# Patient Record
Sex: MALE | Race: WHITE | NOT HISPANIC OR LATINO | Employment: OTHER | URBAN - METROPOLITAN AREA
[De-identification: names, ages, dates, MRNs, and addresses within clinical notes are randomized per-mention and may not be internally consistent; named-entity substitution may affect disease eponyms.]

---

## 2018-05-15 ENCOUNTER — TELEPHONE (OUTPATIENT)
Dept: OTHER | Facility: CLINIC | Age: 58
End: 2018-05-15

## 2018-11-19 ENCOUNTER — OFFICE VISIT - HEALTHEAST (OUTPATIENT)
Dept: FAMILY MEDICINE | Facility: CLINIC | Age: 58
End: 2018-11-19

## 2018-11-19 DIAGNOSIS — L82.1 SEBORRHEIC KERATOSIS: ICD-10-CM

## 2018-11-19 DIAGNOSIS — F32.5 MAJOR DEPRESSION IN COMPLETE REMISSION (H): ICD-10-CM

## 2018-11-19 DIAGNOSIS — N50.89 MASS OF RIGHT TESTICLE: ICD-10-CM

## 2018-11-19 ASSESSMENT — MIFFLIN-ST. JEOR: SCORE: 1656.98

## 2018-12-10 ENCOUNTER — COMMUNICATION - HEALTHEAST (OUTPATIENT)
Dept: FAMILY MEDICINE | Facility: CLINIC | Age: 58
End: 2018-12-10

## 2020-02-04 ENCOUNTER — COMMUNICATION - HEALTHEAST (OUTPATIENT)
Dept: FAMILY MEDICINE | Facility: CLINIC | Age: 60
End: 2020-02-04

## 2020-02-04 DIAGNOSIS — F32.5 MAJOR DEPRESSION IN COMPLETE REMISSION (H): ICD-10-CM

## 2020-02-05 ENCOUNTER — COMMUNICATION - HEALTHEAST (OUTPATIENT)
Dept: FAMILY MEDICINE | Facility: CLINIC | Age: 60
End: 2020-02-05

## 2020-05-02 ENCOUNTER — COMMUNICATION - HEALTHEAST (OUTPATIENT)
Dept: FAMILY MEDICINE | Facility: CLINIC | Age: 60
End: 2020-05-02

## 2020-05-02 DIAGNOSIS — F32.5 MAJOR DEPRESSION IN COMPLETE REMISSION (H): ICD-10-CM

## 2020-05-07 ENCOUNTER — COMMUNICATION - HEALTHEAST (OUTPATIENT)
Dept: FAMILY MEDICINE | Facility: CLINIC | Age: 60
End: 2020-05-07

## 2020-05-07 ENCOUNTER — COMMUNICATION - HEALTHEAST (OUTPATIENT)
Dept: SCHEDULING | Facility: CLINIC | Age: 60
End: 2020-05-07

## 2020-05-12 ENCOUNTER — OFFICE VISIT - HEALTHEAST (OUTPATIENT)
Dept: FAMILY MEDICINE | Facility: CLINIC | Age: 60
End: 2020-05-12

## 2020-05-12 ENCOUNTER — COMMUNICATION - HEALTHEAST (OUTPATIENT)
Dept: FAMILY MEDICINE | Facility: CLINIC | Age: 60
End: 2020-05-12

## 2020-05-12 DIAGNOSIS — F33.41 RECURRENT MAJOR DEPRESSIVE DISORDER, IN PARTIAL REMISSION (H): ICD-10-CM

## 2020-05-12 DIAGNOSIS — F32.5 MAJOR DEPRESSION IN COMPLETE REMISSION (H): ICD-10-CM

## 2020-05-12 ASSESSMENT — PATIENT HEALTH QUESTIONNAIRE - PHQ9: SUM OF ALL RESPONSES TO PHQ QUESTIONS 1-9: 12

## 2020-07-31 ENCOUNTER — COMMUNICATION - HEALTHEAST (OUTPATIENT)
Dept: FAMILY MEDICINE | Facility: CLINIC | Age: 60
End: 2020-07-31

## 2020-07-31 DIAGNOSIS — F32.5 MAJOR DEPRESSION IN COMPLETE REMISSION (H): ICD-10-CM

## 2020-08-01 RX ORDER — BUPROPION HYDROCHLORIDE 150 MG/1
150 TABLET, EXTENDED RELEASE ORAL DAILY
Qty: 90 TABLET | Refills: 2 | Status: SHIPPED | OUTPATIENT
Start: 2020-08-01 | End: 2021-07-27

## 2020-10-09 ENCOUNTER — OFFICE VISIT - HEALTHEAST (OUTPATIENT)
Dept: FAMILY MEDICINE | Facility: CLINIC | Age: 60
End: 2020-10-09

## 2020-10-09 DIAGNOSIS — L82.1 SEBORRHEIC KERATOSIS: ICD-10-CM

## 2020-10-09 DIAGNOSIS — Z00.00 ROUTINE GENERAL MEDICAL EXAMINATION AT A HEALTH CARE FACILITY: ICD-10-CM

## 2020-10-09 DIAGNOSIS — F32.5 MAJOR DEPRESSION IN COMPLETE REMISSION (H): ICD-10-CM

## 2020-10-09 DIAGNOSIS — Z12.5 SCREENING FOR PROSTATE CANCER: ICD-10-CM

## 2020-10-09 DIAGNOSIS — K64.8 INTERNAL HEMORRHOID: ICD-10-CM

## 2020-10-09 DIAGNOSIS — N50.89 MASS OF RIGHT TESTICLE: ICD-10-CM

## 2020-10-09 LAB
CHOLEST SERPL-MCNC: 240 MG/DL
FASTING STATUS PATIENT QL REPORTED: YES
FASTING STATUS PATIENT QL REPORTED: YES
GLUCOSE BLD-MCNC: 77 MG/DL (ref 70–125)
HDLC SERPL-MCNC: 62 MG/DL
HIV 1+2 AB+HIV1 P24 AG SERPL QL IA: NEGATIVE
LDLC SERPL CALC-MCNC: 149 MG/DL
PSA SERPL-MCNC: 4.1 NG/ML (ref 0–4.5)
TRIGL SERPL-MCNC: 146 MG/DL

## 2020-10-09 RX ORDER — HYDROCORTISONE ACETATE 25 MG/1
25 SUPPOSITORY RECTAL EVERY 12 HOURS
Qty: 24 SUPPOSITORY | Refills: 1 | Status: SHIPPED | OUTPATIENT
Start: 2020-10-09 | End: 2021-07-27

## 2020-10-09 ASSESSMENT — MIFFLIN-ST. JEOR: SCORE: 1611.85

## 2020-10-09 ASSESSMENT — PATIENT HEALTH QUESTIONNAIRE - PHQ9: SUM OF ALL RESPONSES TO PHQ QUESTIONS 1-9: 3

## 2020-10-12 ENCOUNTER — COMMUNICATION - HEALTHEAST (OUTPATIENT)
Dept: FAMILY MEDICINE | Facility: CLINIC | Age: 60
End: 2020-10-12

## 2020-10-12 LAB — HCV AB SERPL QL IA: NEGATIVE

## 2021-05-26 ASSESSMENT — PATIENT HEALTH QUESTIONNAIRE - PHQ9: SUM OF ALL RESPONSES TO PHQ QUESTIONS 1-9: 12

## 2021-05-27 ASSESSMENT — PATIENT HEALTH QUESTIONNAIRE - PHQ9: SUM OF ALL RESPONSES TO PHQ QUESTIONS 1-9: 3

## 2021-06-02 VITALS — HEIGHT: 72 IN | WEIGHT: 181.2 LBS | BODY MASS INDEX: 24.54 KG/M2

## 2021-06-05 VITALS
WEIGHT: 173 LBS | SYSTOLIC BLOOD PRESSURE: 132 MMHG | HEART RATE: 80 BPM | BODY MASS INDEX: 24.22 KG/M2 | DIASTOLIC BLOOD PRESSURE: 80 MMHG | HEIGHT: 71 IN

## 2021-06-05 NOTE — TELEPHONE ENCOUNTER
RN cannot approve Refill Request    RN can NOT refill this medication Protocol failed and NO refill given.    Irina Awad, [facility] Triage/Med Refill 2/4/2020    Requested Prescriptions   Pending Prescriptions Disp Refills     buPROPion (WELLBUTRIN SR) 150 MG 12 hr tablet [Pharmacy Med Name: BUPROPION SR 150MG TABLETS (12 H)] 90 tablet 4     Sig: TAKE 1 TABLET BY MOUTH DAILY       Tricyclics/Misc Antidepressant/Antianxiety Meds Refill Protocol Failed - 2/4/2020  2:30 PM        Failed - PCP or prescribing provider visit in last year     Last office visit with prescriber/PCP: 11/19/2018 Precious Gonzalez MD OR same dept: Visit date not found OR same specialty: 11/19/2018 Precious Gonzalez MD  Last physical: Visit date not found Last MTM visit: Visit date not found   Next visit within 3 mo: Visit date not found  Next physical within 3 mo: Visit date not found  Prescriber OR PCP: Precious Gonzalez MD  Last diagnosis associated with med order: There are no diagnoses linked to this encounter.  If protocol passes may refill for 12 months if within 3 months of last provider visit (or a total of 15 months).

## 2021-06-07 NOTE — TELEPHONE ENCOUNTER
Left message to call back for: PT  Information to relay to patient:  Left message to call and schedule telephone or video visit with michelle

## 2021-06-07 NOTE — TELEPHONE ENCOUNTER
Left message to call back for: pt  Information to relay to patient:  2nd message to call and schedule with Christina Gutierrez re: follow up on depression

## 2021-06-07 NOTE — TELEPHONE ENCOUNTER
RN cannot approve Refill Request    RN can NOT refill this medication Protocol failed and NO refill given.    Irina Awad, [facility] Triage/Med Refill 5/4/2020    Requested Prescriptions   Pending Prescriptions Disp Refills     buPROPion (WELLBUTRIN SR) 150 MG 12 hr tablet [Pharmacy Med Name: BUPROPION SR 150MG TABLETS (12 H)] 90 tablet 3     Sig: TAKE 1 TABLET BY MOUTH DAILY       Tricyclics/Misc Antidepressant/Antianxiety Meds Refill Protocol Failed - 5/2/2020 12:10 PM        Failed - PCP or prescribing provider visit in last year     Last office visit with prescriber/PCP: 11/19/2018 Precious Gonzalez MD OR same dept: Visit date not found OR same specialty: 11/19/2018 Precious Gonzalez MD  Last physical: Visit date not found Last MTM visit: Visit date not found   Next visit within 3 mo: Visit date not found  Next physical within 3 mo: Visit date not found  Prescriber OR PCP: Precious Gonzalez MD  Last diagnosis associated with med order: 1. Major depression in complete remission (H)  - buPROPion (WELLBUTRIN SR) 150 MG 12 hr tablet [Pharmacy Med Name: BUPROPION SR 150MG TABLETS (12 H)]; TAKE 1 TABLET BY MOUTH DAILY  Dispense: 90 tablet; Refill: 0    If protocol passes may refill for 12 months if within 3 months of last provider visit (or a total of 15 months).

## 2021-06-08 NOTE — PROGRESS NOTES
"Moisés Sorensen is a 59 y.o. male who is being evaluated via a billable video visit.      The patient has been notified of following:     \"This video visit will be conducted via a call between you and your physician/provider. We have found that certain health care needs can be provided without the need for an in-person physical exam.  This service lets us provide the care you need with a video conversation.  If a prescription is necessary we can send it directly to your pharmacy.  If lab work is needed we can place an order for that and you can then stop by our lab to have the test done at a later time.    Video visits are billed at different rates depending on your insurance coverage. Please reach out to your insurance provider with any questions.    If during the course of the call the physician/provider feels a video visit is not appropriate, you will not be charged for this service.\"    Patient has given verbal consent to a Video visit? Yes    Patient would like to receive their AVS by AVS Preference: Kamila.    Patient would like the video invitation sent by: Text to cell phone: [phone]    Will anyone else be joining your video visit? No        Video Start Time: 9:15 AM    Assessment/Plan:    1. Major depression in complete remission (H)  His PHQ 9 score is 12. Patient feels that this is not entirely accurate and more so related to situational anxiety due to coronavirus pandemic. He prefers to stay on current dose of wellbutrin. Will provide refills. He is due for annual exam and routine healthcare. I encouraged him to schedule this within 3 months.   Depression well controlled on current regimen.  We will have him continue with Wellbutrin 150 mg once daily.  - buPROPion (WELLBUTRIN SR) 150 MG 12 hr tablet; Take 1 tablet (150 mg total) by mouth daily.  Dispense: 90 tablet; Refill: 4      The following are part of a depression follow up plan for the patient:  mental savanna screening education, mental " health screening management, mental health treatment assessment and mental health treatment education    Subjective:    Moisés Sorensen is a 59-year-old male here today for a video visit medication check.  He has been a year and a half since patient has been seen in clinic.  Past medical history is significant for depression and anxiety.  He has been on Wellbutrin sustained release 150 mg daily with great effect.  Previously was taking 2 but does not feel the need to be taking more than current dose at this time.  He does have occasional anxieties which he attributes to coronavirus pandemic.  Otherwise feels that depression anxiety is well controlled overall.  Occasionally has some insomnia for which he takes an Advil PM.  He feels that he is able to manage the insomnia at this point without the need for additional medication assistance.  He does not take any other regular medications.  He is due for an annual exam.      Past Medical History, Family History, and Social History reviewed.    No past surgical history on file.     Family History   Problem Relation Age of Onset     COPD Mother      Diabetes Son 16        type 1        No past medical history on file.     Social History     Tobacco Use     Smoking status: Never Smoker     Smokeless tobacco: Never Used   Substance Use Topics     Alcohol use: Not on file     Drug use: Not on file        Current Outpatient Medications   Medication Sig Dispense Refill     buPROPion (WELLBUTRIN SR) 150 MG 12 hr tablet Take 1 tablet (150 mg total) by mouth daily. 90 tablet 4     No current facility-administered medications for this visit.           Objective:    General Appearance:  Alert, pleasant, no distress, appears stated age   Neurologic: Appear grossly intact         This note has been dictated using voice recognition software. Any grammatical or context distortions are unintentional and inherent to the use of this software.       Video-Visit Details    Type of  service:  Video Visit    Video End Time (time video stopped): 9:15 AM  Originating Location (pt. Location): Home    Distant Location (provider location):  [facility] MEDICINE/OB     Platform used for Video Visit: Nick Gutierrez CNP

## 2021-06-10 NOTE — TELEPHONE ENCOUNTER
Refill Approved    Rx renewed per Medication Renewal Policy. Medication was last renewed on 5/12/20, last OV 5/12/20.    Danyelle Andujar, [facility] Triage/Med Refill 8/1/2020     Requested Prescriptions   Pending Prescriptions Disp Refills     buPROPion (WELLBUTRIN SR) 150 MG 12 hr tablet [Pharmacy Med Name: BUPROPION SR 150MG TABLETS (12 H)] 90 tablet 4     Sig: TAKE 1 TABLET BY MOUTH DAILY       Tricyclics/Misc Antidepressant/Antianxiety Meds Refill Protocol Passed - 7/31/2020 12:05 PM        Passed - PCP or prescribing provider visit in last year     Last office visit with prescriber/PCP: 11/19/2018 Precious Gonzalez MD OR same dept: Visit date not found OR same specialty: 11/19/2018 Precious Gonzalez MD  Last physical: Visit date not found Last MTM visit: Visit date not found   Next visit within 3 mo: Visit date not found  Next physical within 3 mo: Visit date not found  Prescriber OR PCP: Precious Gonzalez MD  Last diagnosis associated with med order: 1. Major depression in complete remission (H)  - buPROPion (WELLBUTRIN SR) 150 MG 12 hr tablet [Pharmacy Med Name: BUPROPION SR 150MG TABLETS (12 H)]; TAKE 1 TABLET BY MOUTH DAILY  Dispense: 90 tablet; Refill: 4    If protocol passes may refill for 12 months if within 3 months of last provider visit (or a total of 15 months).

## 2021-06-12 NOTE — TELEPHONE ENCOUNTER
Prior Authorization Request  Who s requesting:  Pharmacy  Pharmacy Name and Location: [facility]  Medication Name: hydrocortisone 25 mg suppository  Insurance Plan: [employer]  Insurance Member ID Number:  [id number]  CoverMyMeds Key: N/A  Informed patient that prior authorizations can take up to 10 business days for response:   NA  Okay to leave a detailed message: Yes

## 2021-06-12 NOTE — PROGRESS NOTES
Assessment:     1. Routine general medical examination at a health care facility  Ambulatory referral for Colonoscopy    Lipid [facility] FASTING    Td, Adult, Adsorbed (blue label)    Glucose    HIV Antigen/Antibody Screening [facility]    Hepatitis C Antibody (Anti-HCV)   2. Major depression in complete remission (H)     3. Seborrheic keratosis  Ambulatory referral to Dermatology   4. Mass of right testicle  Ambulatory referral to Urology   5. Internal hemorrhoid  hydrocortisone 25 mg suppository   6. Screening for prostate cancer  PSA, Annual Screen (Prostatic-Specific Antigen)        Plan:      1. Routine healthcare maintenance.  Preventative cares reviewed.  Encouraged healthy lifestyle modifications in regards to diet and exercise.  Patient declines Td, influenza, Shingrix today.  Will check fasting glucose and lipids.  Will do routine HIV and hepatitis C screening.  Continue ongoing annual exams.  2. Depression remains in remission while on bupropion 150 mg daily.  Continue at current dose.  3.  Seborrheic keratoses on center back.  Patient requesting referral to dermatology to discuss removal and for routine skin checks.  4.  Referral placed to urology for evaluation of right testicle mass.  5.  Patient's current symptoms and findings on exam most consistent with internal hemorrhoid.  Prescription for hydro cortisone suppository provided.  He will use for 2 weeks and follow-up with any persisting or worsening symptoms.  6.  Do routine prostate cancer screening today.    The following are part of a depression follow up plan for the patient:  mental health screening assessment, mental savanna screening education, mental health treatment assessment and mental health treatment education             Subjective:      Moisés Sorensen is a 60 y.o. male who presents for an annual exam.  Patient reports doing well over the last year.  It is been several years since he has been in for an annual exam.  Past medical  history includes depression, currently in remission.  He remains on bupropion 150 mg daily for management.  Tolerating well.  He has a few seborrheic keratoses lesions that he would like evaluated by dermatology to discuss possible removal.  He is also interested in routine skin checks.  He has had concern about a mass on his right testicle for several months.  Was going to follow-up with urology in regards to this but has not yet.  He had an ultrasound of his testicles and scrotum in 2018 which indicated benign mass.  He is hoping to discuss having this removed by urology.  He denies having any new urinary symptoms.  He is having some discomfort and itching in his rectum which he feels could be a hemorrhoid.  He has never had one in the past.  Denies any rectal bleeding.  He has been more constipated than usual recently.  Has felt the need to strain with BMs on multiple occasions.  He is requesting referral for routine colonoscopy which is due in 6 months.    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  Does not smoke.      Healthy Habits:   Regular Exercise: No  Healthy Diet: Yes  Dental Visits Regularly: Yes  Seat Belt: Yes   Sexually active: Yes  Colonoscopy: Yes  Lipid Profile: Yes  Glucose Screen: Yes    There is no immunization history for the selected administration types on file for this patient.  Immunization status: Tetanus administered today. Refuses flu, shingles, pneumonia.      Current Outpatient Medications   Medication Sig Dispense Refill     buPROPion (WELLBUTRIN SR) 150 MG 12 hr tablet Take 1 tablet (150 mg total) by mouth daily. 90 tablet 2     hydrocortisone 25 mg suppository Insert 1 suppository (25 mg total) into the rectum every 12 (twelve) hours. 24 suppository 1     No current facility-administered medications for this visit.      History reviewed. No pertinent past medical history.  History reviewed. No pertinent surgical history.  Patient has no known allergies.  Family  "History   Problem Relation Age of Onset     COPD Mother      Diabetes Son 16        type 1     Social History     Socioeconomic History     Marital status:      Spouse name: Not on file     Number of children: Not on file     Years of education: Not on file     Highest education level: Not on file   Occupational History     Not on file   Social Needs     Financial resource strain: Not on file     Food insecurity     Worry: Not on file     Inability: Not on file     Transportation needs     Medical: Not on file     Non-medical: Not on file   Tobacco Use     Smoking status: Never Smoker     Smokeless tobacco: Never Used   Substance and Sexual Activity     Alcohol use: Not on file     Drug use: Not on file     Sexual activity: Not on file   Lifestyle     Physical activity     Days per week: Not on file     Minutes per session: Not on file     Stress: Not on file   Relationships     Social connections     Talks on phone: Not on file     Gets together: Not on file     Attends Religion service: Not on file     Active member of club or organization: Not on file     Attends meetings of clubs or organizations: Not on file     Relationship status: Not on file     Intimate partner violence     Fear of current or ex partner: Not on file     Emotionally abused: Not on file     Physically abused: Not on file     Forced sexual activity: Not on file   Other Topics Concern     Not on file   Social History Narrative     Not on file       Review of Systems  Comprehensive ROS: as above, otherwise all negative.           Objective:     /80   Pulse 80   Ht 5' 11\" (1.803 m)   Wt 173 lb (78.5 kg)   BMI 24.13 kg/m    Body mass index is 24.13 kg/m .    Physical    General Appearance:    Alert, cooperative, no distress, appears stated age.     Head:    Normocephalic, without obvious abnormality, atraumatic   Eyes:    PERRL, conjunctiva/corneas clear, EOM's intact, fundi     benign, both eyes        Ears:    Normal TM's and " external ear canals, both ears   Nose:   Nares normal, septum midline, mucosa normal, no drainage    or sinus tenderness   Throat:   Lips, mucosa, and tongue normal; teeth and gums normal   Neck:   Supple, symmetrical, trachea midline, no adenopathy   Back:     Symmetric, no curvature, ROM normal, no CVA tenderness   Lungs:     Clear to auscultation bilaterally, respirations unlabored   Heart:    Regular rate and rhythm, S1 and S2 normal, no murmur, rub   or gallop   Abdomen:     Soft, non-tender, bowel sounds active all four quadrants,     no masses, no organomegaly.     Genitalia:    Normal male without lesion, discharge.  Small nodule in the right testicle.   Rectal:    Normal tone.  Prostate normal/symmetric, small mass most consistent with internal hemorrhoid noted on rectal exam today.   Extremities:   Extremities normal, atraumatic, no cyanosis or edema   Pulses:   2+ and symmetric all extremities   Skin:   Skin color, texture, turgor normal, no rashes or lesions   Lymph nodes:   Cervical, supraclavicular, and axillary nodes normal   Neurologic:   CNII-XII intact. Normal strength, sensation and reflexes       throughout                This note has been dictated using voice recognition software. Any grammatical or context distortions are unintentional and inherent to the use of this software.

## 2021-06-16 PROBLEM — L82.1 SEBORRHEIC KERATOSIS: Status: ACTIVE | Noted: 2018-11-19

## 2021-06-16 PROBLEM — F32.5 MAJOR DEPRESSION IN COMPLETE REMISSION (H): Status: ACTIVE | Noted: 2018-11-19

## 2021-06-17 NOTE — TELEPHONE ENCOUNTER
Telephone Encounter by Bridget Walker at 10/14/2020  3:46 PM     Author: Bridget Walker Service: -- Author Type: Patient Access    Filed: 10/14/2020  3:49 PM Encounter Date: 10/12/2020 Status: Signed    : Bridget Walker (Patient Access)       Prior Authorization Not Needed    I called the pharmacy and left the below information on their voicemail along with some NDC options that could probably process through for them.  They do have my direct number if they need further assistance.

## 2021-06-19 ENCOUNTER — HEALTH MAINTENANCE LETTER (OUTPATIENT)
Age: 61
End: 2021-06-19

## 2021-06-20 NOTE — LETTER
Letter by Precious Gonzalez MD at      Author: Precious Gonzalez MD Service: -- Author Type: --    Filed:  Encounter Date: 5/7/2020 Status: (Other)         Moisés Sorensen  [address] [place] [id number]      May 7, 2020      Dear Moisés Sorensen,    Per our refill protocol, you are due for a medication check office visit. Your prescription for Wellbutrin was sent to your pharmacy ([id number]). Please call [phone] to schedule an virtual visit with Dr Gonzalez or Christina Gutierrez before your next refill is needed to avoid delays.    Thank you,  [facility]

## 2021-06-20 NOTE — LETTER
Letter by Precious Gonzalez MD at      Author: Precious Gonzalez MD Service: -- Author Type: --    Filed:  Encounter Date: 5/7/2020 Status: (Other)         Moisés Sorensen  [address] [place] [id number]      May 7, 2020      Dear Moisés Sorensen,    Per our refill protocol, you are due for a medication check office visit. Your prescription for Wellbutrin was sent to your pharmacy.. Please call [phone] to schedule an office visit with  before your next refill is needed to avoid delays.    Thank you,  [facility]

## 2021-06-20 NOTE — LETTER
Letter by Precious Gonzalez MD at      Author: Precious Gonzalez MD Service: -- Author Type: --    Filed:  Encounter Date: 2/5/2020 Status: (Other)         Moisés Sorensen  [address] [place] [id number]      February 5, 2020      Dear Moisés Sorensen,    Per our refill protocol, you are due for a medication check office visit. Your prescription for Wellbutrin was sent to your pharmacy (02.05.2020). Please call [phone] to schedule an office visit with Dr. Gonzalez  before your next refill is needed to avoid delays.    Thank you,  [facility]

## 2021-06-21 NOTE — PROGRESS NOTES
Assessment/Plan:     1. Major depression in complete remission (H)  Well controlled on current regimen which she will continue.  Reviewed over-the-counter and nonmedical means to treat associated insomnia.  Follow-up in 1 year at most, sooner further problems or concerns.    2. Mass of right testicle  Will obtain follow-up ultrasound given perception of change.  Further follow-up and recommendations pending results.  - US Scrotum and Testicles W Duplex Ltd; Future    3. Seborrheic keratosis  Reassurance provided that this is a benign process and requires no further evaluation, treatment, or monitoring.      Patient Instructions   Our radiology office will call you to schedule ultrasound.    Return to clinic in 1 year for follow-up of depression.  Consider returning for a fasting physical in the interim.       Return in about 1 year (around 11/19/2019) for Depression.      Subjective:      Moisés Sorensen is a 58 y.o. male Center to clinic today to establish care and with a few concerns.  He is , this is his second marriage, 2 children both boys ages 16 and 22.  Works as a musician and , he and his wife are just adding onto their home in [place] with the intent to have music lessons, patient teaching both guitar and [employer].  He does not smoke.  Up to 1 alcoholic beverage per day, usually not daily.  Admits to no regular exercise but is not sedentary and moving much of the time.    Past medical history notable for depression and anxiety.  Is done quite well on Wellbutrin sustained release 150 mg previously twice daily and most recently taking just once daily with excellent effect.  Some struggles with insomnia.  Did not tolerate trazodone in the past as he had very terrible dreams.  Overall feels that he is managing well.  Some difficulties intermittently with early awakening, melatonin helpful with initial sleep onset.  Occasionally taking Tylenol PM with good effect.    History of  hyperlipidemia.  He initially did not tolerate Lipitor then has been taking some simvastatin regularly, ran out not too long ago.  Several years ago had difficulties with a lump or cyst on his right testicle.  Ultrasound was performed that was benign.  Unfortunately not able to find records of this.  States that no follow-up is needed.  Now more recently has noted a nagging sensation of pressure or otherwise bothering in his right testicle.  No change in size that he notes.  Is not really tender or swollen.  I note that his chart reports a diagnosis of hydrocele though patient describes it more as a focal cyst that is marblelike in size.    Current Outpatient Medications   Medication Sig Dispense Refill     buPROPion (WELLBUTRIN SR) 150 MG 12 hr tablet Take 1 tablet (150 mg total) by mouth daily Prescribed 1 tablet twice daily, patient only taking one tablet daily. 90 tablet 4     No current facility-administered medications for this visit.        Past Medical History, Family History, and Social History reviewed.  No past medical history on file.  No past surgical history on file.  Patient has no known allergies.  Family History   Problem Relation Age of Onset     COPD Mother      Diabetes Son 16        type 1     Social History     Socioeconomic History     Marital status:      Spouse name: Not on file     Number of children: Not on file     Years of education: Not on file     Highest education level: Not on file   Social Needs     Financial resource strain: Not on file     Food insecurity - worry: Not on file     Food insecurity - inability: Not on file     Transportation needs - medical: Not on file     Transportation needs - non-medical: Not on file   Occupational History     Not on file   Tobacco Use     Smoking status: Never Smoker     Smokeless tobacco: Never Used   Substance and Sexual Activity     Alcohol use: Not on file     Drug use: Not on file     Sexual activity: Not on file   Other Topics  "Concern     Not on file   Social History Narrative     Not on file         Review of systems is as stated in HPI, and the remainder of the 10 system review is otherwise negative.    Objective:     Vitals:    11/19/18 0944   BP: 122/80   Patient Site: Left Arm   Patient Position: Sitting   Cuff Size: Adult Large   Pulse: 78   Resp: 16   Temp: 98.2  F (36.8  C)   TempSrc: Oral   SpO2: 98%   Weight: 181 lb 3.2 oz (82.2 kg)   Height: 5' 11.5\" (1.816 m)    Body mass index is 24.92 kg/m .    Alert male.  Mucous memories moist.  Neck supple without lymphadenopathy.  Heart with regular rate and rhythm.  Lungs clear.  Abdomen is soft and nontender.  Normal external male genitalia.  Appreciate any significant enlargement of his right testicle in comparison to his left.  I do not appreciate a large hydrocele.  There is a small cystic-like structure that smooth and I agree that is about the size of a marble in the right superior testicle.  No hernias present.  Extremities without edema.  Affect is within normal limits.      This note has been dictated using voice recognition software. Any grammatical or context distortions are unintentional and inherent to the the software.   "

## 2021-07-16 ENCOUNTER — ANCILLARY PROCEDURE (OUTPATIENT)
Dept: GENERAL RADIOLOGY | Facility: CLINIC | Age: 61
End: 2021-07-16
Attending: FAMILY MEDICINE
Payer: COMMERCIAL

## 2021-07-16 ENCOUNTER — OFFICE VISIT (OUTPATIENT)
Dept: FAMILY MEDICINE | Facility: CLINIC | Age: 61
End: 2021-07-16
Payer: COMMERCIAL

## 2021-07-16 VITALS
BODY MASS INDEX: 24.95 KG/M2 | TEMPERATURE: 96.5 F | DIASTOLIC BLOOD PRESSURE: 89 MMHG | WEIGHT: 178.9 LBS | RESPIRATION RATE: 16 BRPM | HEART RATE: 56 BPM | SYSTOLIC BLOOD PRESSURE: 135 MMHG

## 2021-07-16 DIAGNOSIS — M79.605 PAIN OF LEFT LOWER EXTREMITY: ICD-10-CM

## 2021-07-16 DIAGNOSIS — M25.572 PAIN IN JOINT, ANKLE AND FOOT, LEFT: ICD-10-CM

## 2021-07-16 DIAGNOSIS — M25.572 PAIN IN JOINT, ANKLE AND FOOT, LEFT: Primary | ICD-10-CM

## 2021-07-16 PROCEDURE — 73610 X-RAY EXAM OF ANKLE: CPT | Mod: TC | Performed by: RADIOLOGY

## 2021-07-16 PROCEDURE — 72100 X-RAY EXAM L-S SPINE 2/3 VWS: CPT | Mod: TC | Performed by: RADIOLOGY

## 2021-07-16 PROCEDURE — 99214 OFFICE O/P EST MOD 30 MIN: CPT | Performed by: FAMILY MEDICINE

## 2021-07-16 RX ORDER — PREDNISONE 20 MG/1
40 TABLET ORAL DAILY
Qty: 10 TABLET | Refills: 0 | Status: SHIPPED | OUTPATIENT
Start: 2021-07-16 | End: 2021-07-21

## 2021-07-16 NOTE — PROGRESS NOTES
"      Assessment & Plan    1. Pain in joint, ankle and foot, left  Pain is stabbing/jabbing - comes and goes - lasts seconds to minutes - but increasing in sharpness - has spent decades folding his left ankle under his right foot while playing guitar; this may represent some compression on a local nerve - OR - may represent low back nerve compression.  Unable to review xray with patient as unable to access the images during visit.  Will review and move from there.  In the meanwhile, will treat with oral steroids.  If no improvement, consider referral to foot/ankle.    - XR Ankle Left G/E 3 Views; Future  - predniSONE (DELTASONE) 20 MG tablet; Take 2 tablets (40 mg) by mouth daily for 5 days  Dispense: 10 tablet; Refill: 0    2. Pain of left lower extremity  Has intermittent pain left ankle, but also has left groin (intermittent) pain - pain from left lower back - it is not clear that this is related to #1.    - XR Ankle Left G/E 3 Views; Future  - XR Lumbar Spine 2/3 Views; Future  - predniSONE (DELTASONE) 20 MG tablet; Take 2 tablets (40 mg) by mouth daily for 5 days  Dispense: 10 tablet; Refill: 0      HPI     Pain comes/goes  ; left groin - felt like a \"pulled muscle\" a month ago; left heel - occurring 2 weeks ago -     Has tried numerous foot exercises  Icing helped somewhat  Sometimes feels like he is \"compressed\" in his spine  Dull pain in left groin - like pulled a groin muscle    Pain is in left side -   Feels deep on heel - lateral -   Just has a zap of pain - comes and goes -   Very random -   Can stop for a couple days/couple weeks   Noticed when his back health is \"worse\", gets the symtpoms  Chronic back stuff x many many years  Varying degrees of pain    Feels like he's being \"squished\"  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     Current Outpatient Medications   Medication     buPROPion (WELLBUTRIN SR) 150 MG 12 hr tablet     predniSONE (DELTASONE) 20 MG tablet     hydrocortisone 25 mg suppository     No current facility-administered medications for this visit.        No past surgical history on file.     Social History     Socioeconomic History     Marital status:      Spouse name: Not on file     Number of children: Not on file     Years of education: Not on file     Highest education level: Not on file   Occupational History     Not on file   Tobacco Use     Smoking status: Never Smoker     Smokeless tobacco: Never Used   Substance and Sexual Activity     Alcohol use: Not on file     Drug use: Not on file     Sexual activity: Not on file   Other Topics Concern     Not on file   Social History Narrative     Not on file     Social Determinants of Health     Financial Resource Strain:      Difficulty of Paying Living Expenses:    Food Insecurity:      Worried About Running Out of Food in the Last Year:      Ran Out of Food in the Last Year:    Transportation Needs:      Lack of Transportation (Medical):      Lack of Transportation (Non-Medical):    Physical Activity:      Days of Exercise per Week:      Minutes of Exercise per Session:    Stress:      Feeling of Stress :    Social Connections:      Frequency of Communication with Friends and Family:      Frequency of Social Gatherings with Friends and Family:      Attends Yazidi Services:      Active Member of Clubs or Organizations:      Attends Club or Organization Meetings:      Marital Status:     Intimate Partner Violence:      Fear of Current or Ex-Partner:      Emotionally Abused:      Physically Abused:      Sexually Abused:         Family History   Problem Relation Age of Onset     Chronic Obstructive Pulmonary Disease Mother      Diabetes Son 16.00        type 1        Review of Systems   Constitutional: Negative for chills and fever.   HENT: Negative for ear pain and sore throat.    Eyes: Negative for pain and visual disturbance.   Respiratory: Negative for cough and shortness of breath.    Cardiovascular: Negative for chest pain and palpitations.   Gastrointestinal: Negative for abdominal pain and vomiting.   Genitourinary: Negative for dysuria and hematuria.   Musculoskeletal: Negative for arthralgias and back pain.   Skin: Negative for color change and rash.   Neurological: Negative for seizures and syncope.   All other systems reviewed and are negative.       /89 (BP Location: Right arm, Patient Position: Sitting, Cuff Size: Adult Large)   Pulse 56   Temp (!) 96.5  F (35.8  C) (Temporal)   Resp 16   Wt 81.1 kg (178 lb 14.4 oz)   BMI 24.95 kg/m       Physical Exam  Vitals reviewed.   Constitutional:       General: He is not in acute distress.     Appearance: Normal appearance.   HENT:      Head: Normocephalic.      Right Ear: Tympanic membrane, ear canal and external ear normal.      Left Ear: Tympanic membrane, ear canal and external ear normal.      Nose: Nose normal.      Mouth/Throat:      Mouth: Mucous membranes are moist.      Pharynx: No posterior oropharyngeal erythema.   Eyes:      Extraocular Movements: Extraocular movements intact.      Conjunctiva/sclera: Conjunctivae normal.      Pupils: Pupils are equal, round, and reactive to light.   Cardiovascular:      Rate and Rhythm: Normal rate and regular rhythm.      Pulses: Normal pulses.      Heart sounds: Normal heart sounds. No murmur heard.     Pulmonary:      Effort: Pulmonary effort is normal.      Breath sounds: Normal  breath sounds.   Abdominal:      Palpations: Abdomen is soft. There is no mass.      Tenderness: There is no abdominal tenderness. There is no guarding or rebound.   Genitourinary:     Penis: Normal.       Testes: Normal.   Musculoskeletal:         General: No swelling or deformity. Normal range of motion.      Cervical back: Normal range of motion and neck supple.   Lymphadenopathy:      Cervical: No cervical adenopathy.   Skin:     General: Skin is warm and dry.   Neurological:      General: No focal deficit present.      Mental Status: He is alert and oriented to person, place, and time.      Motor: No weakness.      Gait: Gait normal.   Psychiatric:         Mood and Affect: Mood normal.         Behavior: Behavior normal.     Results:  No formal xray results available at the time of this dictation.           BEAU DAVIS MD

## 2021-07-17 PROBLEM — F32.5 MAJOR DEPRESSION IN COMPLETE REMISSION (H): Status: RESOLVED | Noted: 2018-11-19 | Resolved: 2021-07-17

## 2021-07-17 PROBLEM — L82.1 SEBORRHEIC KERATOSIS: Status: RESOLVED | Noted: 2018-11-19 | Resolved: 2021-07-17

## 2021-07-17 ASSESSMENT — ENCOUNTER SYMPTOMS
SHORTNESS OF BREATH: 0
ARTHRALGIAS: 0
EYE PAIN: 0
HEMATURIA: 0
COLOR CHANGE: 0
COUGH: 0
VOMITING: 0
DYSURIA: 0
FEVER: 0
CHILLS: 0
PALPITATIONS: 0
SEIZURES: 0
BACK PAIN: 0
ABDOMINAL PAIN: 0
SORE THROAT: 0

## 2021-07-21 DIAGNOSIS — F32.5 MAJOR DEPRESSION IN COMPLETE REMISSION (H): ICD-10-CM

## 2021-07-26 ENCOUNTER — TELEPHONE (OUTPATIENT)
Dept: FAMILY MEDICINE | Facility: CLINIC | Age: 61
End: 2021-07-26

## 2021-07-26 DIAGNOSIS — M25.572 PAIN IN JOINT, ANKLE AND FOOT, LEFT: Primary | ICD-10-CM

## 2021-07-26 RX ORDER — PREDNISONE 20 MG/1
40 TABLET ORAL DAILY
Qty: 10 TABLET | Refills: 0 | Status: SHIPPED | OUTPATIENT
Start: 2021-07-26 | End: 2021-12-22

## 2021-07-26 NOTE — TELEPHONE ENCOUNTER
Reason for Call:  Medication or medication refill:    Do you use a [facility]?  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 YES    Phone number patient can be reached at: Cell number on file:    Telephone Information:   Mobile [phone]       Best Time: anytime     Call taken on 7/26/2021 at 10:07 AM by Rosa Crawford

## 2021-07-26 NOTE — TELEPHONE ENCOUNTER
Pt is wanting another round of prednisone, he is going on a driving vacation for 2 weeks and has been getting the electronic zaps in his left ankle. He was seen 9 days ago and was given an rx then but has since completed it.

## 2021-07-26 NOTE — TELEPHONE ENCOUNTER
"Routing refill request to provider for review/approval because:  Labs not current:  PHQ-9 score.     Last Written Prescription Date:  8/1/2020  Last Fill Quantity: 90,  # refills: 2   Last office visit provider:  7/16/21     Requested Prescriptions   Pending Prescriptions Disp Refills     buPROPion (WELLBUTRIN SR) 150 MG 12 hr tablet 90 tablet 2     Sig: Take 1 tablet (150 mg) by mouth daily       SSRIs Protocol Failed - 7/21/2021 12:05 PM        Failed - PHQ-9 score less than 5 in past 6 months     Please review last PHQ-9 score.           Failed - Recent (6 mo) or future (30 days) visit within the authorizing provider's specialty     Patient had office visit in the last 6 months or has a visit in the next 30 days with authorizing provider or within the authorizing provider's specialty.  See \"Patient Info\" tab in inbasket, or \"Choose Columns\" in Meds & Orders section of the refill encounter.            Passed - Medication is Bupropion     If the medication is Bupropion (Wellbutrin), and the patient is taking for smoking cessation; OK to refill.          Passed - Medication is active on med list        Passed - Patient is age 18 or older             Shakir Goncalves RN 07/25/21 7:16 PM  "

## 2021-07-27 RX ORDER — BUPROPION HYDROCHLORIDE 150 MG/1
150 TABLET, EXTENDED RELEASE ORAL DAILY
Qty: 90 TABLET | Refills: 2 | Status: SHIPPED | OUTPATIENT
Start: 2021-07-27 | End: 2022-03-03

## 2021-10-10 ENCOUNTER — HEALTH MAINTENANCE LETTER (OUTPATIENT)
Age: 61
End: 2021-10-10

## 2021-10-11 ENCOUNTER — ALLIED HEALTH/NURSE VISIT (OUTPATIENT)
Dept: FAMILY MEDICINE | Facility: CLINIC | Age: 61
End: 2021-10-11
Payer: COMMERCIAL

## 2021-10-11 DIAGNOSIS — Z23 NEED FOR VACCINATION: Primary | ICD-10-CM

## 2021-10-11 PROCEDURE — 90682 RIV4 VACC RECOMBINANT DNA IM: CPT

## 2021-10-11 PROCEDURE — 90471 IMMUNIZATION ADMIN: CPT

## 2021-10-11 PROCEDURE — 99207 PR NO CHARGE NURSE ONLY: CPT

## 2021-10-19 PROBLEM — F32.9 MAJOR DEPRESSION: Status: RESOLVED | Noted: 2018-11-19 | Resolved: 2021-07-17

## 2021-12-04 ENCOUNTER — HEALTH MAINTENANCE LETTER (OUTPATIENT)
Age: 61
End: 2021-12-04

## 2021-12-06 ENCOUNTER — ANCILLARY PROCEDURE (OUTPATIENT)
Dept: GENERAL RADIOLOGY | Facility: CLINIC | Age: 61
End: 2021-12-06
Attending: STUDENT IN AN ORGANIZED HEALTH CARE EDUCATION/TRAINING PROGRAM
Payer: COMMERCIAL

## 2021-12-06 ENCOUNTER — OFFICE VISIT (OUTPATIENT)
Dept: FAMILY MEDICINE | Facility: CLINIC | Age: 61
End: 2021-12-06
Payer: COMMERCIAL

## 2021-12-06 VITALS
OXYGEN SATURATION: 98 % | HEART RATE: 69 BPM | BODY MASS INDEX: 25.16 KG/M2 | SYSTOLIC BLOOD PRESSURE: 152 MMHG | DIASTOLIC BLOOD PRESSURE: 84 MMHG | WEIGHT: 180.4 LBS

## 2021-12-06 DIAGNOSIS — R10.32 LEFT GROIN PAIN: Primary | ICD-10-CM

## 2021-12-06 DIAGNOSIS — R10.32 LEFT GROIN PAIN: ICD-10-CM

## 2021-12-06 PROCEDURE — 73502 X-RAY EXAM HIP UNI 2-3 VIEWS: CPT | Mod: TC | Performed by: RADIOLOGY

## 2021-12-06 PROCEDURE — 99214 OFFICE O/P EST MOD 30 MIN: CPT | Performed by: STUDENT IN AN ORGANIZED HEALTH CARE EDUCATION/TRAINING PROGRAM

## 2021-12-06 RX ORDER — VALACYCLOVIR HYDROCHLORIDE 1 G/1
1000 TABLET, FILM COATED ORAL 3 TIMES DAILY
Qty: 21 TABLET | Refills: 0 | Status: SHIPPED | OUTPATIENT
Start: 2021-12-06 | End: 2021-12-13

## 2021-12-06 NOTE — PROGRESS NOTES
"  Assessment and Plan     61-year-old male who presents with left inner thigh pain going on intermittently for a year.  Worsened over the last month.  Just in the last week he has started to have a rash in the area that appears mostly like irritation at the skin.  No obvious vesicles were other pathognomonic features.  I recommended working this up with an x-ray of his hip given that most of the pain seems into his groin, ultrasound of his left lower extremity looking for thrombophlebitis and empiric treatment for shingles as his description of the pain is somewhat consistent with this though he does not have the typical rash.  Once I get the results back to him all these if it is normal and valacyclovir does not improve symptoms I would would consider doing something like an EMG study given the shooting electric-like pain he has in the area being possible peripheral neuropathy versus an MRI of his lumbar spine as he does have some pain at his SI joint on the left.    1. Left groin pain  - XR Hip Left 2-3 Views; Future  - US Lower Extremity Venous Duplex Bilateral; Future  - valACYclovir (VALTREX) 1000 mg tablet; Take 1 tablet (1,000 mg) by mouth 3 times daily for 7 days  Dispense: 21 tablet; Refill: 0    Follow up: Pending studies  Options for treatment and follow-up care were reviewed with the patient and/or guardian. Moisés PENNY Sorensen and/or guardian engaged in the decision making process and verbalized understanding of the options discussed and agreed with the final plan.    Dr. Neo Reynoso         HPI:   Moisés PENNY VillatoroSorensen is a 61 year old  male who presents for:    Chief Complaint   Patient presents with     Derm Problem     skin irritation, sharp shooting pain in inner thigh on left side. left wrist pain, lifted something heavy.      Patient tells me he has had pain in his inner thigh to the right side going on intermittely since this last summer. He describes it as a \"jolt\" like sensation. He now has a " "rash in ther area that happened over the weekend. He describes the pain as \"sgharp and shooting\" last for a couple of seconds and then going away. Nothing seems to bring it on. He feels like if he stretched his groin area that helps. He has had some pain in his low back going on as well. Denies numbness or tingling. Denies symptoms shooting down the leg. Very specific to his inner thigh.           PMHX:     Patient Active Problem List   Diagnosis     Anxiety     Depression, major     Dyslipidemia     Insomnia     Mixed hyperlipidemia     Benign neoplasm of sigmoid colon     Polyp of colon       Current Outpatient Medications   Medication Sig Dispense Refill     buPROPion (WELLBUTRIN SR) 150 MG 12 hr tablet Take 1 tablet (150 mg) by mouth daily 90 tablet 2       Social History     Tobacco Use     Smoking status: Never Smoker     Smokeless tobacco: Never Used   Substance Use Topics     Alcohol use: Not on file     Drug use: Not on file       Social History     Social History Narrative     Not on file       No Known Allergies    No results found for this or any previous visit (from the past 24 hour(s)).         Review of Systems:    ROS: 10 point ROS neg other than the symptoms noted above in the HPI.         Physical Exam:     Vitals:    12/06/21 1427   BP: (!) 152/84   BP Location: Right arm   Patient Position: Sitting   Cuff Size: Adult Regular   Pulse: 69   SpO2: 98%   Weight: 81.8 kg (180 lb 6.4 oz)     Body mass index is 25.16 kg/m .    General appearance: Alert, cooperative, no distress, appears stated age  Head: Normocephalic, atraumatic, without obvious abnormality  Eyes: Pupils equal round, reactive.  Conjunctiva clear.  Nose: Nares normal, no drainage.  Throat: Lips, mucosa, tongue normal mucosa pink and moist  Neck: Supple, symmetric, trachea midline  Extremities: Extremities normal, atraumatic.  No cyanosis or edema.  Skin: reddened irritated skin over his left inner thigh  Neurologic: CN II through XII " intact, normal strength.      Left Hip:  Inspection: Swelling - Yes mild in left inner hip; Bruising - no  ROM: Flexion -normal; Extension - normal; ER - normal; IR -normal; Abduction -normal; Adduction normal  Strength: Full in all planes;   Tenderness: Trochanter - no ASIS - no; Inguinal Ligament - no; Ischial Tuberosity-  no; Hamstring - no; SI Joint - Yes.  Maneuvers: LISA - normal; FADIR - normal; SI joint - Yes;

## 2021-12-07 ASSESSMENT — PATIENT HEALTH QUESTIONNAIRE - PHQ9: SUM OF ALL RESPONSES TO PHQ QUESTIONS 1-9: 5

## 2021-12-07 NOTE — RESULT ENCOUNTER NOTE
I called and spoke with the patient about their recent clinic visit results. I answered any questions they had.      Dr. Neo Reynoso

## 2021-12-10 ENCOUNTER — HOSPITAL ENCOUNTER (OUTPATIENT)
Dept: ULTRASOUND IMAGING | Facility: CLINIC | Age: 61
Discharge: HOME | End: 2021-12-10
Attending: STUDENT IN AN ORGANIZED HEALTH CARE EDUCATION/TRAINING PROGRAM | Admitting: STUDENT IN AN ORGANIZED HEALTH CARE EDUCATION/TRAINING PROGRAM
Payer: COMMERCIAL

## 2021-12-10 DIAGNOSIS — M54.16 LUMBAR RADICULOPATHY: Primary | ICD-10-CM

## 2021-12-10 DIAGNOSIS — R10.32 LEFT GROIN PAIN: ICD-10-CM

## 2021-12-10 PROCEDURE — 93971 EXTREMITY STUDY: CPT | Mod: LT

## 2021-12-10 NOTE — RESULT ENCOUNTER NOTE
"I called and spoke with the patient about their recent clinic visit results. I answered any questions they had. Patient still having pain in inner thigh. Having more back pain and \"seizing up\" Recommended MRI of his lumbar spine to work up further. Interestingly he tells me skin changes maybe frost bite form cold pack.       Dr. Neo Reynoso    "

## 2021-12-22 ENCOUNTER — OFFICE VISIT (OUTPATIENT)
Dept: FAMILY MEDICINE | Facility: CLINIC | Age: 61
End: 2021-12-22
Payer: COMMERCIAL

## 2021-12-22 VITALS
TEMPERATURE: 98.2 F | HEART RATE: 80 BPM | SYSTOLIC BLOOD PRESSURE: 146 MMHG | DIASTOLIC BLOOD PRESSURE: 60 MMHG | HEIGHT: 71 IN | WEIGHT: 180 LBS | OXYGEN SATURATION: 97 % | RESPIRATION RATE: 20 BRPM | BODY MASS INDEX: 25.2 KG/M2

## 2021-12-22 DIAGNOSIS — M54.42 CHRONIC BILATERAL LOW BACK PAIN WITH LEFT-SIDED SCIATICA: Primary | ICD-10-CM

## 2021-12-22 DIAGNOSIS — G89.29 CHRONIC BILATERAL LOW BACK PAIN WITH LEFT-SIDED SCIATICA: Primary | ICD-10-CM

## 2021-12-22 PROCEDURE — 99213 OFFICE O/P EST LOW 20 MIN: CPT | Performed by: FAMILY MEDICINE

## 2021-12-22 RX ORDER — PREDNISONE 20 MG/1
40 TABLET ORAL DAILY
Qty: 10 TABLET | Refills: 0 | Status: SHIPPED | OUTPATIENT
Start: 2021-12-22 | End: 2021-12-27

## 2021-12-22 RX ORDER — GABAPENTIN 300 MG/1
300 CAPSULE ORAL 3 TIMES DAILY PRN
Qty: 90 CAPSULE | Refills: 1 | Status: SHIPPED | OUTPATIENT
Start: 2021-12-22 | End: 2023-07-19

## 2021-12-22 ASSESSMENT — MIFFLIN-ST. JEOR: SCORE: 1630.66

## 2021-12-29 NOTE — PROGRESS NOTES
Assessment/Plan:     Chronic bilateral low back pain with left-sided sciatica  His symptoms are most consistent with left lumbar radiculopathy.  We reviewed symptomatic treatments.  Because he had good effect with prednisone in the past, will repeat course of prednisone.  We will also use gabapentin as needed for discomfort.  Hold off on NSAIDs until course of prednisone is complete.  Referral placed for physical therapy.  Reviewed red flag warning signs and symptoms, when to notify us or seek urgent evaluation for new symptoms.  If symptoms are not improving and controlled, could consider MRI his neck step.  He is comfortable with this plan.  - Physical Therapy Referral; Future  - gabapentin (NEURONTIN) 300 MG capsule; Take 1 capsule (300 mg) by mouth 3 times daily as needed for neuropathic pain  - predniSONE (DELTASONE) 20 MG tablet; Take 2 tablets (40 mg) by mouth daily for 5 days      There are no Patient Instructions on file for this visit.     No follow-ups on file.  Advised future Tdap and Shingrix.      Subjective:      Moisés Sorensen is a 61 year old male presenting to clinic today to discuss low back pain associated with pain in his left leg.  Onset of symptoms in March with sharp shooting pain in his left posterior heel and left inner thigh.  Then began to notice increasing pain in his low back with tightness, stiffness, and spasms.  He had a course of prednisone following an ultrasound was negative for DVT in that left leg, prednisone was somewhat helpful and now it seems to have returned.  Pain is worse if he is doing any prolonged standing, sitting in a car for long stretch, or intermittently with sleep.  He is noting some improvement with stretching.  He has been taking 3-4 tabs of Advil daily and it is somewhat helpful with the back pain.  Denies any injury.    Current Outpatient Medications   Medication Sig Dispense Refill     gabapentin (NEURONTIN) 300 MG capsule Take 1 capsule (300 mg) by  "mouth 3 times daily as needed for neuropathic pain 90 capsule 1     buPROPion (WELLBUTRIN SR) 150 MG 12 hr tablet Take 1 tablet (150 mg) by mouth daily 90 tablet 2     valACYclovir (VALTREX) 1000 mg tablet Take 1 tablet (1,000 mg) by mouth 3 times daily for 7 days 21 tablet 0       Past Medical History, Family History, and Social History reviewed.  No past medical history on file.  No past surgical history on file.  Patient has no known allergies.  Family History   Problem Relation Age of Onset     Chronic Obstructive Pulmonary Disease Mother      Diabetes Son 16.00        type 1     Social History     Socioeconomic History     Marital status:      Spouse name: Not on file     Number of children: Not on file     Years of education: Not on file     Highest education level: Not on file   Occupational History     Not on file   Tobacco Use     Smoking status: Never Smoker     Smokeless tobacco: Never Used   Substance and Sexual Activity     Alcohol use: Not on file     Drug use: Not on file     Sexual activity: Not on file   Other Topics Concern     Not on file   Social History Narrative     Not on file     Social Determinants of Health     Financial Resource Strain: Not on file   Food Insecurity: Not on file   Transportation Needs: Not on file   Physical Activity: Not on file   Stress: Not on file   Social Connections: Not on file   Intimate Partner Violence: Not on file   Housing Stability: Not on file         Review of systems is as stated in HPI, and the remainder of the 10 system review is otherwise negative.    Objective:     Vitals:    12/22/21 0927   BP: (!) 146/60   Pulse: 80   Resp: 20   Temp: 98.2  F (36.8  C)   TempSrc: Temporal   SpO2: 97%   Weight: 81.6 kg (180 lb)   Height: 1.791 m (5' 10.5\")    Body mass index is 25.46 kg/m .    Alert male.  He has good range of motion of his low back throughout.  He has no midline spinal tenderness.  Mild tenderness to palpation and muscle spasm of the " paraspinous muscles in the lumbar region bilaterally.  Mild discomfort in the SI joints left greater than right.  5 and 5 strength throughout his lower extremities.  Good range of motion at hips and knees.  Deep tendon reflexes intact.  Sensation intact.      This note has been dictated using voice recognition software. Any grammatical or context distortions are unintentional and inherent to the the software.

## 2022-03-03 DIAGNOSIS — F32.5 MAJOR DEPRESSION IN COMPLETE REMISSION (H): ICD-10-CM

## 2022-03-03 RX ORDER — BUPROPION HYDROCHLORIDE 150 MG/1
150 TABLET, EXTENDED RELEASE ORAL DAILY
Qty: 90 TABLET | Refills: 1 | Status: SHIPPED | OUTPATIENT
Start: 2022-03-03 | End: 2023-06-02

## 2022-03-03 NOTE — TELEPHONE ENCOUNTER
Pending Prescriptions:                       Disp   Refills    buPROPion (WELLBUTRIN SR) 150 MG 12 hr ta*90 tab*2            Sig: Take 1 tablet (150 mg) by mouth daily    Last filled- 07.27.2021  Last OV- 12.22.2021

## 2022-09-18 ENCOUNTER — HEALTH MAINTENANCE LETTER (OUTPATIENT)
Age: 62
End: 2022-09-18

## 2023-01-28 ENCOUNTER — HEALTH MAINTENANCE LETTER (OUTPATIENT)
Age: 63
End: 2023-01-28

## 2023-07-19 ENCOUNTER — OFFICE VISIT (OUTPATIENT)
Dept: FAMILY MEDICINE | Facility: CLINIC | Age: 63
End: 2023-07-19
Payer: COMMERCIAL

## 2023-07-19 VITALS
RESPIRATION RATE: 20 BRPM | DIASTOLIC BLOOD PRESSURE: 70 MMHG | OXYGEN SATURATION: 97 % | TEMPERATURE: 97.9 F | HEIGHT: 71 IN | WEIGHT: 188 LBS | SYSTOLIC BLOOD PRESSURE: 136 MMHG | HEART RATE: 90 BPM | BODY MASS INDEX: 26.32 KG/M2

## 2023-07-19 DIAGNOSIS — F32.5 MAJOR DEPRESSIVE DISORDER IN FULL REMISSION, UNSPECIFIED WHETHER RECURRENT (H): ICD-10-CM

## 2023-07-19 DIAGNOSIS — Z12.5 SCREENING FOR PROSTATE CANCER: ICD-10-CM

## 2023-07-19 DIAGNOSIS — Z00.00 ROUTINE PHYSICAL EXAMINATION: Primary | ICD-10-CM

## 2023-07-19 DIAGNOSIS — Z12.11 SCREEN FOR COLON CANCER: ICD-10-CM

## 2023-07-19 DIAGNOSIS — Z13.1 SCREENING FOR DIABETES MELLITUS: ICD-10-CM

## 2023-07-19 DIAGNOSIS — N50.89 TESTICULAR MASS: ICD-10-CM

## 2023-07-19 DIAGNOSIS — E78.2 MIXED HYPERLIPIDEMIA: ICD-10-CM

## 2023-07-19 DIAGNOSIS — G47.00 INSOMNIA, UNSPECIFIED TYPE: ICD-10-CM

## 2023-07-19 DIAGNOSIS — F41.9 ANXIETY: ICD-10-CM

## 2023-07-19 LAB
CHOLEST SERPL-MCNC: 237 MG/DL
HBA1C MFR BLD: 5.4 % (ref 0–5.6)
HDLC SERPL-MCNC: 59 MG/DL
LDLC SERPL CALC-MCNC: 115 MG/DL
NONHDLC SERPL-MCNC: 178 MG/DL
PSA SERPL DL<=0.01 NG/ML-MCNC: 6.15 NG/ML (ref 0–4.5)
TRIGL SERPL-MCNC: 317 MG/DL

## 2023-07-19 PROCEDURE — 83036 HEMOGLOBIN GLYCOSYLATED A1C: CPT | Performed by: FAMILY MEDICINE

## 2023-07-19 PROCEDURE — 36415 COLL VENOUS BLD VENIPUNCTURE: CPT | Performed by: FAMILY MEDICINE

## 2023-07-19 PROCEDURE — 99396 PREV VISIT EST AGE 40-64: CPT | Performed by: FAMILY MEDICINE

## 2023-07-19 PROCEDURE — 80061 LIPID PANEL: CPT | Performed by: FAMILY MEDICINE

## 2023-07-19 PROCEDURE — G0103 PSA SCREENING: HCPCS | Performed by: FAMILY MEDICINE

## 2023-07-19 PROCEDURE — 99213 OFFICE O/P EST LOW 20 MIN: CPT | Mod: 25 | Performed by: FAMILY MEDICINE

## 2023-07-19 RX ORDER — BUPROPION HYDROCHLORIDE 150 MG/1
150 TABLET, EXTENDED RELEASE ORAL DAILY
Qty: 90 TABLET | Refills: 4 | Status: SHIPPED | OUTPATIENT
Start: 2023-07-19

## 2023-07-19 ASSESSMENT — ENCOUNTER SYMPTOMS
ABDOMINAL PAIN: 0
DIARRHEA: 0
DYSURIA: 0
EYE PAIN: 0
MYALGIAS: 0
CONSTIPATION: 0
FEVER: 0
HEMATOCHEZIA: 0
PARESTHESIAS: 0
SHORTNESS OF BREATH: 0
DIZZINESS: 0
SORE THROAT: 0
COUGH: 0
HEMATURIA: 0
JOINT SWELLING: 0
ARTHRALGIAS: 0
NAUSEA: 0
PALPITATIONS: 0
NERVOUS/ANXIOUS: 1
CHILLS: 0
WEAKNESS: 0
FREQUENCY: 0
HEADACHES: 0
HEARTBURN: 0

## 2023-07-19 ASSESSMENT — ANXIETY QUESTIONNAIRES
6. BECOMING EASILY ANNOYED OR IRRITABLE: NOT AT ALL
GAD7 TOTAL SCORE: 10
2. NOT BEING ABLE TO STOP OR CONTROL WORRYING: MORE THAN HALF THE DAYS
1. FEELING NERVOUS, ANXIOUS, OR ON EDGE: MORE THAN HALF THE DAYS
4. TROUBLE RELAXING: SEVERAL DAYS
GAD7 TOTAL SCORE: 10
5. BEING SO RESTLESS THAT IT IS HARD TO SIT STILL: SEVERAL DAYS
3. WORRYING TOO MUCH ABOUT DIFFERENT THINGS: MORE THAN HALF THE DAYS
7. FEELING AFRAID AS IF SOMETHING AWFUL MIGHT HAPPEN: MORE THAN HALF THE DAYS
IF YOU CHECKED OFF ANY PROBLEMS ON THIS QUESTIONNAIRE, HOW DIFFICULT HAVE THESE PROBLEMS MADE IT FOR YOU TO DO YOUR WORK, TAKE CARE OF THINGS AT HOME, OR GET ALONG WITH OTHER PEOPLE: SOMEWHAT DIFFICULT

## 2023-07-19 ASSESSMENT — PAIN SCALES - GENERAL: PAINLEVEL: NO PAIN (0)

## 2023-07-19 ASSESSMENT — PATIENT HEALTH QUESTIONNAIRE - PHQ9
10. IF YOU CHECKED OFF ANY PROBLEMS, HOW DIFFICULT HAVE THESE PROBLEMS MADE IT FOR YOU TO DO YOUR WORK, TAKE CARE OF THINGS AT HOME, OR GET ALONG WITH OTHER PEOPLE: SOMEWHAT DIFFICULT
SUM OF ALL RESPONSES TO PHQ QUESTIONS 1-9: 8
SUM OF ALL RESPONSES TO PHQ QUESTIONS 1-9: 8

## 2023-07-19 NOTE — PATIENT INSTRUCTIONS
You are due for tetanus booster.  Check your insurance coverage as many prefer that you have this administered at a pharmacy rather than a clinic.    Schedule a visit with [facility] regarding your testicular mass and desire for vasectomy.  Preventive Health Recommendations  Male Ages 50 - 64    Yearly exam:             See your health care provider every year in order to  o   Review health changes.   o   Discuss preventive care.    o   Review your medicines if your doctor has prescribed any.   Have a cholesterol test every 5 years, or more frequently if you are at risk for high cholesterol/heart disease.   Have a diabetes test (fasting glucose) every three years. If you are at risk for diabetes, you should have this test more often.   Have a colonoscopy at age 50, or have a yearly FIT test (stool test). These exams will check for colon cancer.    Talk with your health care provider about whether or not a prostate cancer screening test (PSA) is right for you.  You should be tested each year for STDs (sexually transmitted diseases), if you re at risk.     Shots: Get a flu shot each year. Get a tetanus shot every 10 years.     Nutrition:  Eat at least 5 servings of fruits and vegetables daily.   Eat whole-grain bread, whole-wheat pasta and brown rice instead of white grains and rice.   Get adequate Calcium and Vitamin D.     Lifestyle  Exercise for at least 150 minutes a week (30 minutes a day, 5 days a week). This will help you control your weight and prevent disease.   Limit alcohol to one drink per day.   No smoking.   Wear sunscreen to prevent skin cancer.   See your dentist every six months for an exam and cleaning.   See your eye doctor every 1 to 2 years.

## 2023-07-19 NOTE — PROGRESS NOTES
Assessment/Plan:     Routine physical examination  Encouraged healthy lifestyle habits including regular exercise, healthy eating habits, and adequate calcium and vitamin D intake.  Advised Tdap.  Order placed for screening colonoscopy.  Discussed risk versus benefits of prostate cancer screening, he is agreeable to PSA level today.  Information provided regarding advance healthcare directives.    Mixed hyperlipidemia  Encourage efforts at healthy lifestyle habits.  We will check nonfasting lipid panel today.  - Lipid panel reflex to direct LDL Non-fasting; Future    Major depressive disorder in full remission, unspecified whether recurrent (H)  Anxiety  Stable and adequately controlled on bupropion at current dose.  Continue.  Refill provided.    Insomnia, unspecified type  Encouraged good sleep hygiene.    Testicular mass  He is interested in having this resected, referral placed urology.  Is also interested in vasectomy.  - Adult Urology  Referral; Future    Screen for colon cancer  - Colonoscopy Screening  Referral; Future    Screening for prostate cancer  - Prostate Specific Antigen Screen; Future    Screening for diabetes mellitus  , History of diabetes in mother, therefore we will check A1c as he is not fasting today.  - Hemoglobin A1c; Future     Patient Instructions   You are due for tetanus booster.  Check your insurance coverage as many prefer that you have this administered at a pharmacy rather than a clinic.    Schedule a visit with [facility] regarding your testicular mass and desire for vasectomy.  Preventive Health Recommendations  Male Ages 50 - 64    Yearly exam:             See your health care provider every year in order to  o   Review health changes.   o   Discuss preventive care.    o   Review your medicines if your doctor has prescribed any.     Have a cholesterol test every 5 years, or more frequently if you are at risk for high cholesterol/heart disease.     Have  a diabetes test (fasting glucose) every three years. If you are at risk for diabetes, you should have this test more often.     Have a colonoscopy at age 50, or have a yearly FIT test (stool test). These exams will check for colon cancer.      Talk with your health care provider about whether or not a prostate cancer screening test (PSA) is right for you.    You should be tested each year for STDs (sexually transmitted diseases), if you re at risk.     Shots: Get a flu shot each year. Get a tetanus shot every 10 years.     Nutrition:    Eat at least 5 servings of fruits and vegetables daily.     Eat whole-grain bread, whole-wheat pasta and brown rice instead of white grains and rice.     Get adequate Calcium and Vitamin D.     Lifestyle    Exercise for at least 150 minutes a week (30 minutes a day, 5 days a week). This will help you control your weight and prevent disease.     Limit alcohol to one drink per day.     No smoking.     Wear sunscreen to prevent skin cancer.     See your dentist every six months for an exam and cleaning.     See your eye doctor every 1 to 2 years.         No follow-ups on file.         Subjective:     Moisés Sorensen is a 62 year old male who presents for an annual exam.     Doing well over the past year without significant changes in his health.  Bupropion is working well to manage his depression and anxiety symptoms, overall is feeling stable and is interested in continuing at same dose.  Chronic right testicular mass, benign during assessment, interested in resection and would like to see urology.  Also interested in vasectomy.  History of dyslipidemia due for follow-up, he is not fasting today.  Notes family history of diabetes in his mother, type 1 diabetes and son.  Father was recently diagnosed cancer in his 80s, they are not intervening it is felt to be slow-growing.  Exercising by doing quite a bit of yard work in the summer currently.  Diet overall healthy.  Chronic  struggles with sleep, awakens 1-2 times at night, this is stable.    Works as a .  He and his wife will be traveling to [place], [place], and [place] for 16 days later this summer.    Healthy Habits:     Getting at least 3 servings of Calcium per day:  Yes    Bi-annual eye exam:  Yes    Dental care twice a year:  Yes    Sleep apnea or symptoms of sleep apnea:  None    Diet:  Regular (no restrictions) and Low fat/cholesterol    Frequency of exercise:  None    Taking medications regularly:  Yes    Medication side effects:  None    Additional concerns today:  No       Healthy Habits:   Healthy Diet: Yes  Regular Exercise: Yes  Sunscreen Use: Yes  Dental Visits Regularly: Yes  Seat Belt: Yes    Health Maintenance reviewed:  Lipid Profile: Due, not fasting  Glucose Screen: Due, not fasting  Colonoscopy: 2011, overdue      Immunization History   Administered Date(s) Administered     COVID-19 Bivalent 12+ (Pfizer) 10/10/2022     COVID-19 MONOVALENT 12+ (Pfizer) 11/26/2021     COVID-19 Monovalent 12+ (Pfizer 2022) 06/05/2022     COVID-19 Vaccine (Veronica) 03/09/2021     Influenza Vaccine 50-64 or 18-64 w/egg allergy (Flublok) 10/11/2021     Influenza Vaccine >6 months (Alfuria,Fluzone) 10/10/2022     TDAP (Adacel,Boostrix) 07/20/2009     Zoster recombinant adjuvanted (SHINGRIX) 06/27/2022, 10/20/2022     Immunization status: Eligible for Tdap, otherwise up-to-date    Current Outpatient Medications   Medication Sig Dispense Refill     buPROPion (WELLBUTRIN SR) 150 MG 12 hr tablet Take 1 tablet (150 mg) by mouth daily **OFFICE VISIT REQUIRED FOR FURTHER REFILLS** 90 tablet 0     valACYclovir (VALTREX) 1000 mg tablet Take 1 tablet (1,000 mg) by mouth 3 times daily for 7 days 21 tablet 0     History reviewed. No pertinent past medical history.  History reviewed. No pertinent surgical history.  Patient has no known allergies.  Family History   Problem Relation Age of Onset     Chronic Obstructive Pulmonary Disease  "Mother      Diabetes Son 16.00        type 1     Social History     Socioeconomic History     Marital status:      Spouse name: Not on file     Number of children: Not on file     Years of education: Not on file     Highest education level: Not on file   Occupational History     Not on file   Tobacco Use     Smoking status: Never     Smokeless tobacco: Never   Substance and Sexual Activity     Alcohol use: Not on file     Drug use: Not on file     Sexual activity: Not on file   Other Topics Concern     Not on file   Social History Narrative     Not on file     Social Determinants of Health     Financial Resource Strain: Not on file   Food Insecurity: Not on file   Transportation Needs: Not on file   Physical Activity: Not on file   Stress: Not on file   Social Connections: Not on file   Intimate Partner Violence: Not on file   Housing Stability: Not on file       Review of Systems  General:  Denies problem  Eyes: Denies problem  Ears/Nose/Throat: Denies problem  Cardiovascular: Denies problem  Respiratory:  Denies problem  Gastrointestinal:  Denies problem   Genitourinary: Denies problem  Musculoskeletal:  Denies problem  Skin: Denies problem  Neurologic: Denies problem  Psychiatric: Denies problem  Endocrine: Denies problem  Heme/Lymphatic: Denies problem   Allergic/Immunologic: Denies problem           Objective:        Vitals:    07/19/23 1158   BP: 136/70   Pulse: 90   Resp: 20   Temp: 97.9  F (36.6  C)   SpO2: 97%   Weight: 85.3 kg (188 lb)   Height: 1.791 m (5' 10.5\")   PainSc: No Pain (0)     Body mass index is 26.59 kg/m .    Physical Exam:  General Appearance: Alert, pleasant, appears stated age  Head: Normocephalic, without obvious abnormality  Eyes: PERRL, conjunctiva/corneas clear, EOM's intact  Ears: Normal TM's and external ear canals, both ears  Nose: Nares normal, septum midline,mucosa normal, no drainage  Throat: Lips, mucosa, and tongue normal; teeth and gums normal; oropharynx is " clear  Neck: Supple,without lymphadenopathy or thyromegally  Lungs: Clear to auscultation bilaterally, respirations unlabored  Heart: Regular rate and rhythm, no murmur   Abdomen: Soft, non-tender, no masses, no organomegaly  Extremities: Extremities with strong and symmetric pulses, no cyanosis or edema  Skin: Skin color, texture normal, no rashes or lesions; multiple seborrheic dermatoses on back and one on the left abdomen, all benign in appearance.  Neurologic: Normal              This note has been dictated using voice recognition software. Any grammatical or context distortions are unintentional and inherent to the the software.          Answers for HPI/ROS submitted by the patient on 7/19/2023  If you checked off any problems, how difficult have these problems made it for you to do your work, take care of things at home, or get along with other people?: Somewhat difficult  PHQ9 TOTAL SCORE: 8  THONY 7 TOTAL SCORE: 10  Frequency of exercise:: None  Getting at least 3 servings of Calcium per day:: Yes  Diet:: Regular (no restrictions), Low fat/cholesterol  Taking medications regularly:: Yes  Medication side effects:: None  Bi-annual eye exam:: Yes  Dental care twice a year:: Yes  Sleep apnea or symptoms of sleep apnea:: None  abdominal pain: No  Blood in stool: No  Blood in urine: No  chest pain: No  chills: No  congestion: No  constipation: No  cough: No  diarrhea: No  dizziness: No  ear pain: No  eye pain: No  nervous/anxious: Yes  fever: No  frequency: No  genital sores: No  headaches: No  hearing loss: No  heartburn: No  arthralgias: No  joint swelling: No  peripheral edema: No  mood changes: No  myalgias: No  nausea: No  dysuria: No  palpitations: No  Skin sensation changes: No  sore throat: No  urgency: No  rash: No  shortness of breath: No  visual disturbance: No  weakness: No  impotence: No  penile discharge: No  Additional concerns today:: No

## 2023-07-20 DIAGNOSIS — R97.20 ELEVATED PROSTATE SPECIFIC ANTIGEN (PSA): Primary | ICD-10-CM

## 2023-07-20 NOTE — RESULT ENCOUNTER NOTE
Your PSA level is elevated.  This is increased since it was last checked.  The Next step is for you to be seen by a urologist, a prostate specialist, for further evaluation.  I will add this to the referral that has already been placed for urology.  Discussed this with him as well when you see them in clinic.

## 2023-08-27 ENCOUNTER — OFFICE VISIT (OUTPATIENT)
Dept: FAMILY MEDICINE | Facility: CLINIC | Age: 63
End: 2023-08-27
Payer: COMMERCIAL

## 2023-08-27 VITALS
OXYGEN SATURATION: 96 % | SYSTOLIC BLOOD PRESSURE: 145 MMHG | HEART RATE: 69 BPM | DIASTOLIC BLOOD PRESSURE: 88 MMHG | RESPIRATION RATE: 18 BRPM | TEMPERATURE: 97.3 F

## 2023-08-27 DIAGNOSIS — H61.23 BILATERAL IMPACTED CERUMEN: Primary | ICD-10-CM

## 2023-08-27 PROCEDURE — 69209 REMOVE IMPACTED EAR WAX UNI: CPT | Mod: 50 | Performed by: PHYSICIAN ASSISTANT

## 2023-08-27 NOTE — PROGRESS NOTES
Assessment & Plan:      Problem List Items Addressed This Visit    None  Visit Diagnoses       Bilateral impacted cerumen    -  Primary    Relevant Orders    Patient care order          Medical Decision Making  Patient presents with plugged ear sensation bilaterally.  Physical exam shows cerumen impaction.  Clinical assistant successfully irrigated both ears without complication.  Patient noted significant improvement of symptoms.  No signs of otitis media or otitis externa.  Discussed appropriate ear care.  Discussed treatment and symptomatic care.  Allergies and medication interactions reviewed.  Discussed signs of worsening symptoms and when to follow-up with PCP if no symptom improvement.     Subjective:      Moisés Sorensen is a 63 year old male here for evaluation of plugged ear sensation.  Onset of symptoms about 1 week ago.  Patient tried using cotton tip swabs last night and symptoms appear worse.  No pains.     The following portions of the patient's history were reviewed and updated as appropriate: allergies, current medications, and problem list.     Review of Systems  Pertinent items are noted in HPI.    Allergies  No Known Allergies    Family History   Problem Relation Age of Onset    Diabetes Mother     Chronic Obstructive Pulmonary Disease Mother     Prostate Cancer Father     Diabetes Son 16        type 1       Social History     Tobacco Use    Smoking status: Never    Smokeless tobacco: Never   Substance Use Topics    Alcohol use: Not on file        Objective:      BP (!) 145/88   Pulse 69   Temp 97.3  F (36.3  C)   Resp 18   SpO2 96%   General appearance - alert, well appearing, and in no distress and non-toxic  Ears - unable to visualize TMs due to cerumen impaction bilaterally, otherwise no tenderness to tragus or auricle palpation    The use of Dragon/Augment dictation services was used to construct the content of this note; any grammatical errors are non-intentional. Please contact  the author directly if you are in need of any clarification.

## 2023-08-29 ENCOUNTER — TRANSFERRED RECORDS (OUTPATIENT)
Dept: HEALTH INFORMATION MANAGEMENT | Facility: CLINIC | Age: 63
End: 2023-08-29
Payer: COMMERCIAL

## 2023-09-08 ENCOUNTER — TRANSFERRED RECORDS (OUTPATIENT)
Dept: HEALTH INFORMATION MANAGEMENT | Facility: CLINIC | Age: 63
End: 2023-09-08
Payer: COMMERCIAL

## 2024-01-29 ENCOUNTER — TRANSFERRED RECORDS (OUTPATIENT)
Dept: HEALTH INFORMATION MANAGEMENT | Facility: CLINIC | Age: 64
End: 2024-01-29
Payer: COMMERCIAL

## 2024-02-21 ENCOUNTER — TRANSFERRED RECORDS (OUTPATIENT)
Dept: HEALTH INFORMATION MANAGEMENT | Facility: CLINIC | Age: 64
End: 2024-02-21
Payer: COMMERCIAL

## 2024-03-25 ENCOUNTER — OFFICE VISIT (OUTPATIENT)
Dept: FAMILY MEDICINE | Facility: CLINIC | Age: 64
End: 2024-03-25
Payer: COMMERCIAL

## 2024-03-25 VITALS
WEIGHT: 178 LBS | TEMPERATURE: 98.3 F | DIASTOLIC BLOOD PRESSURE: 92 MMHG | BODY MASS INDEX: 25.18 KG/M2 | HEART RATE: 72 BPM | RESPIRATION RATE: 18 BRPM | OXYGEN SATURATION: 99 % | SYSTOLIC BLOOD PRESSURE: 163 MMHG

## 2024-03-25 DIAGNOSIS — S61.213A LACERATION OF LEFT MIDDLE FINGER WITHOUT FOREIGN BODY WITHOUT DAMAGE TO NAIL, INITIAL ENCOUNTER: Primary | ICD-10-CM

## 2024-03-25 PROCEDURE — 99213 OFFICE O/P EST LOW 20 MIN: CPT | Performed by: PHYSICIAN ASSISTANT

## 2024-03-25 NOTE — PROGRESS NOTES
Chief Complaint   Patient presents with    Laceration     Cut left ring finger on broken glass today         ASSESSMENT/PLAN:  Moisés was seen today for laceration.    Diagnoses and all orders for this visit:    Laceration of left middle finger without foreign body without damage to nail, initial encounter    Neurovascularly intact, wound edges have already approximated with bleeding stopped.  Discussed suture versus glue and opted to use glue today.  Glue was applied over the wound.  Discussed wound care and glue care    Terry Jimenez PA-C      SUBJECTIVE:  Moisés is a 63 year old male who presents to urgent care with a cut of his left middle finger.  He was making breakfast and a glass broke cutting his finger.  He flushed it with water for about 30 seconds under the sink and then wrapped it and came here.    ROS: Pertinent ROS neg other than the symptoms noted above in the HPI.     OBJECTIVE:  BP (!) 163/92 (BP Location: Right arm, Patient Position: Sitting, Cuff Size: Adult Regular)   Pulse 72   Temp 98.3  F (36.8  C) (Oral)   Resp 18   Wt 80.7 kg (178 lb)   SpO2 99%   BMI 25.18 kg/m     GENERAL: alert and no distress  MS: 7 mm laceration on the radial aspect of the left middle finger, not actively bleeding, wound edges approximated  SKIN: no suspicious lesions or rashes  NEURO: Normal strength and tone, mentation intact and speech normal, full sensation distal to injury    DIAGNOSTICS    No results found for any visits on 03/25/24.     Current Outpatient Medications   Medication    buPROPion (WELLBUTRIN SR) 150 MG 12 hr tablet    valACYclovir (VALTREX) 1000 mg tablet     No current facility-administered medications for this visit.      Patient Active Problem List   Diagnosis    Anxiety    Depression, major    Insomnia    Mixed hyperlipidemia    Benign neoplasm of sigmoid colon    Elevated prostate specific antigen (PSA)      No past medical history on file.  No past surgical history on file.  Family  History   Problem Relation Age of Onset    Diabetes Mother     Chronic Obstructive Pulmonary Disease Mother     Prostate Cancer Father     Diabetes Son 16        type 1     Social History     Tobacco Use    Smoking status: Never    Smokeless tobacco: Never   Substance Use Topics    Alcohol use: Not on file              The plan of care was discussed with the patient. They understand and agree with the course of treatment prescribed. A printed summary was given including instructions and medications.  The use of Dragon/[employer] dictation services may have been used to construct the content in this note; any grammatical or spelling errors are non-intentional. Please contact the author of this note directly if you are in need of any clarification.

## 2024-06-19 ENCOUNTER — PATIENT OUTREACH (OUTPATIENT)
Dept: CARE COORDINATION | Facility: CLINIC | Age: 64
End: 2024-06-19
Payer: COMMERCIAL

## 2024-07-03 ENCOUNTER — PATIENT OUTREACH (OUTPATIENT)
Dept: CARE COORDINATION | Facility: CLINIC | Age: 64
End: 2024-07-03
Payer: COMMERCIAL

## 2024-12-06 SDOH — HEALTH STABILITY: PHYSICAL HEALTH: ON AVERAGE, HOW MANY DAYS PER WEEK DO YOU ENGAGE IN MODERATE TO STRENUOUS EXERCISE (LIKE A BRISK WALK)?: 3 DAYS

## 2024-12-06 SDOH — HEALTH STABILITY: PHYSICAL HEALTH: ON AVERAGE, HOW MANY MINUTES DO YOU ENGAGE IN EXERCISE AT THIS LEVEL?: 60 MIN

## 2024-12-06 ASSESSMENT — SOCIAL DETERMINANTS OF HEALTH (SDOH): HOW OFTEN DO YOU GET TOGETHER WITH FRIENDS OR RELATIVES?: ONCE A WEEK

## 2024-12-08 PROBLEM — D12.4 BENIGN NEOPLASM OF DESCENDING COLON: Status: ACTIVE | Noted: 2023-09-08

## 2024-12-08 PROBLEM — C61 ADENOCARCINOMA OF PROSTATE (H): Status: ACTIVE | Noted: 2024-12-08

## 2024-12-08 PROBLEM — D12.2 BENIGN NEOPLASM OF ASCENDING COLON: Status: ACTIVE | Noted: 2023-09-08

## 2024-12-11 ENCOUNTER — OFFICE VISIT (OUTPATIENT)
Dept: FAMILY MEDICINE | Facility: CLINIC | Age: 64
End: 2024-12-11
Payer: MEDICARE

## 2024-12-11 VITALS
HEART RATE: 80 BPM | RESPIRATION RATE: 18 BRPM | DIASTOLIC BLOOD PRESSURE: 66 MMHG | HEIGHT: 71 IN | BODY MASS INDEX: 24.43 KG/M2 | TEMPERATURE: 98.4 F | SYSTOLIC BLOOD PRESSURE: 148 MMHG | OXYGEN SATURATION: 99 % | WEIGHT: 174.5 LBS

## 2024-12-11 DIAGNOSIS — Z23 NEED FOR TDAP VACCINATION: ICD-10-CM

## 2024-12-11 DIAGNOSIS — E78.2 MIXED HYPERLIPIDEMIA: ICD-10-CM

## 2024-12-11 DIAGNOSIS — Z00.00 WELCOME TO MEDICARE PREVENTIVE VISIT: Primary | ICD-10-CM

## 2024-12-11 DIAGNOSIS — G47.00 INSOMNIA, UNSPECIFIED TYPE: ICD-10-CM

## 2024-12-11 DIAGNOSIS — R03.0 ELEVATED BLOOD PRESSURE READING WITHOUT DIAGNOSIS OF HYPERTENSION: ICD-10-CM

## 2024-12-11 DIAGNOSIS — Z23 NEED FOR PROPHYLACTIC VACCINATION AGAINST HEPATITIS A: ICD-10-CM

## 2024-12-11 DIAGNOSIS — F41.9 ANXIETY: ICD-10-CM

## 2024-12-11 DIAGNOSIS — M75.42 IMPINGEMENT SYNDROME OF SHOULDER REGION, LEFT: ICD-10-CM

## 2024-12-11 DIAGNOSIS — F32.5 MAJOR DEPRESSIVE DISORDER IN FULL REMISSION, UNSPECIFIED WHETHER RECURRENT (H): ICD-10-CM

## 2024-12-11 DIAGNOSIS — C61 ADENOCARCINOMA OF PROSTATE (H): ICD-10-CM

## 2024-12-11 DIAGNOSIS — R53.83 FATIGUE, UNSPECIFIED TYPE: ICD-10-CM

## 2024-12-11 PROBLEM — R97.20 ELEVATED PROSTATE SPECIFIC ANTIGEN (PSA): Status: RESOLVED | Noted: 2023-07-20 | Resolved: 2024-12-11

## 2024-12-11 LAB
ALBUMIN SERPL BCG-MCNC: 4 G/DL (ref 3.5–5.2)
ALBUMIN UR-MCNC: NEGATIVE MG/DL
ALP SERPL-CCNC: 378 U/L (ref 40–150)
ALT SERPL W P-5'-P-CCNC: 57 U/L (ref 0–70)
ANION GAP SERPL CALCULATED.3IONS-SCNC: 6 MMOL/L (ref 7–15)
APPEARANCE UR: CLEAR
AST SERPL W P-5'-P-CCNC: 47 U/L (ref 0–45)
BACTERIA #/AREA URNS HPF: ABNORMAL /HPF
BILIRUB SERPL-MCNC: 0.4 MG/DL
BILIRUB UR QL STRIP: NEGATIVE
BUN SERPL-MCNC: 16.9 MG/DL (ref 8–23)
CALCIUM SERPL-MCNC: 9.3 MG/DL (ref 8.8–10.4)
CHLORIDE SERPL-SCNC: 103 MMOL/L (ref 98–107)
CHOLEST SERPL-MCNC: 217 MG/DL
COLOR UR AUTO: YELLOW
CREAT SERPL-MCNC: 1.03 MG/DL (ref 0.67–1.17)
EGFRCR SERPLBLD CKD-EPI 2021: 81 ML/MIN/1.73M2
ERYTHROCYTE [DISTWIDTH] IN BLOOD BY AUTOMATED COUNT: 12.8 % (ref 10–15)
FASTING STATUS PATIENT QL REPORTED: NO
FASTING STATUS PATIENT QL REPORTED: NO
GLUCOSE SERPL-MCNC: 88 MG/DL (ref 70–99)
GLUCOSE UR STRIP-MCNC: NEGATIVE MG/DL
HCO3 SERPL-SCNC: 29 MMOL/L (ref 22–29)
HCT VFR BLD AUTO: 42.4 % (ref 40–53)
HDLC SERPL-MCNC: 80 MG/DL
HGB BLD-MCNC: 14.4 G/DL (ref 13.3–17.7)
HGB UR QL STRIP: ABNORMAL
KETONES UR STRIP-MCNC: NEGATIVE MG/DL
LDLC SERPL CALC-MCNC: 108 MG/DL
LEUKOCYTE ESTERASE UR QL STRIP: NEGATIVE
MCH RBC QN AUTO: 29.5 PG (ref 26.5–33)
MCHC RBC AUTO-ENTMCNC: 34 G/DL (ref 31.5–36.5)
MCV RBC AUTO: 87 FL (ref 78–100)
NITRATE UR QL: NEGATIVE
NONHDLC SERPL-MCNC: 137 MG/DL
PH UR STRIP: 6 [PH] (ref 5–8)
PLATELET # BLD AUTO: 270 10E3/UL (ref 150–450)
POTASSIUM SERPL-SCNC: 4.8 MMOL/L (ref 3.4–5.3)
PROT SERPL-MCNC: 8.1 G/DL (ref 6.4–8.3)
RBC # BLD AUTO: 4.88 10E6/UL (ref 4.4–5.9)
RBC #/AREA URNS AUTO: ABNORMAL /HPF
SODIUM SERPL-SCNC: 138 MMOL/L (ref 135–145)
SP GR UR STRIP: 1.02 (ref 1–1.03)
SQUAMOUS #/AREA URNS AUTO: ABNORMAL /LPF
TRIGL SERPL-MCNC: 143 MG/DL
TSH SERPL DL<=0.005 MIU/L-ACNC: 3.19 UIU/ML (ref 0.3–4.2)
UROBILINOGEN UR STRIP-ACNC: 0.2 E.U./DL
WBC # BLD AUTO: 5.9 10E3/UL (ref 4–11)
WBC #/AREA URNS AUTO: ABNORMAL /HPF

## 2024-12-11 RX ORDER — BUPROPION HYDROCHLORIDE 150 MG/1
150 TABLET, EXTENDED RELEASE ORAL DAILY
Qty: 90 TABLET | Refills: 4 | Status: SHIPPED | OUTPATIENT
Start: 2024-12-11 | End: 2024-12-11

## 2024-12-11 RX ORDER — BUPROPION HYDROCHLORIDE 150 MG/1
150 TABLET, EXTENDED RELEASE ORAL DAILY
Qty: 90 TABLET | Refills: 4 | Status: SHIPPED | OUTPATIENT
Start: 2024-12-11 | End: 2024-12-12

## 2024-12-11 ASSESSMENT — PAIN SCALES - GENERAL: PAINLEVEL_OUTOF10: NO PAIN (0)

## 2024-12-11 NOTE — PROGRESS NOTES
Prior to immunization administration, verified patients identity using patient s name and date of birth. Please see Immunization Activity for additional information.     Screening Questionnaire for Adult Immunization    Are you sick today?   No   Do you have allergies to medications, food, a vaccine component or latex?   No   Have you ever had a serious reaction after receiving a vaccination?   No   Do you have a long-term health problem with heart, lung, kidney, or metabolic disease (e.g., diabetes), asthma, a blood disorder, no spleen, complement component deficiency, a cochlear implant, or a spinal fluid leak?  Are you on long-term aspirin therapy?   No   Do you have cancer, leukemia, HIV/AIDS, or any other immune system problem?   No   Do you have a parent, brother, or sister with an immune system problem?   No   In the past 3 months, have you taken medications that affect  your immune system, such as prednisone, other steroids, or anticancer drugs; drugs for the treatment of rheumatoid arthritis, Crohn s disease, or psoriasis; or have you had radiation treatments?   No   Have you had a seizure, or a brain or other nervous system problem?   No   During the past year, have you received a transfusion of blood or blood    products, or been given immune (gamma) globulin or antiviral drug?   No   For women: Are you pregnant or is there a chance you could become       pregnant during the next month?   No   Have you received any vaccinations in the past 4 weeks?   No     Immunization questionnaire answers were all negative.      Patient instructed to remain in clinic for 15 minutes afterwards, and to report any adverse reactions.     Screening performed by Myrna Phillips CMA on 12/11/2024 at 9:05 AM.    Preventive Care Visit  [facility]  Precious Gonzalez MD, Family Medicine  Dec 11, 2024      Assessment & Plan     Welcome to Medicare preventive visit  At today's visit, we discussed lifestyle  interventions to promote self-management and wellness, including maintenance of a healthy weight, healthy diet, regular physical activity and exercise, and falls prevention.  Advised Tdap and hepatitis A vaccines, it appears these are best covered at that pharmacy to advise that he have them performed there.  Will check nonfasting lipids today to screen for dyslipidemia.  Screening EKG performed as noted.  Up-to-date with colon cancer screening.  - Lipid panel reflex to direct LDL Non-fasting; Future  - Lipid panel reflex to direct LDL Non-fasting    Need for Tdap vaccination  - Tdap, tetanus-diptheria-acell pertussis, (BOOSTRIX) 5-2.5-18.5 LF-MCG/0.5 BOY injection; Inject 0.5 mLs into the muscle once for 1 dose.    Need for prophylactic vaccination against hepatitis A  - hepatitis A vaccine (VAQTA) 50 UNIT/ML injection; Inject 1 mL (50 Units) into the muscle once for 1 dose.    Mixed hyperlipidemia  Encourage efforts at healthy lifestyle habits.  We will check nonfasting lipids today.  - Lipid panel reflex to direct LDL Non-fasting; Future  - Lipid panel reflex to direct LDL Non-fasting    Major depressive disorder in full remission, unspecified whether recurrent (H)  Anxiety  Inadequate control with significant worsening after discontinuing bupropion 6 months ago.  Will start by having him resume bupropion  mg once daily which worked well for him historically.  Follow-up in 6 to 8 months.  Advised efforts at healthy lifestyle habits.  Notify with side effects.  - buPROPion (WELLBUTRIN SR) 150 MG 12 hr tablet; Take 1 tablet (150 mg) by mouth daily.    Insomnia, unspecified type  Encouraged regular exercise, limitation of caffeine intake, management of anxiety and depression as above, and information provided regarding good sleep hygiene.  Will follow.    Adenocarcinoma of prostate (H)  He continues to work with urology, they check PSA level there.    Elevated blood pressure reading without diagnosis of  hypertension  Will check for thyroid dysfunction, proteinuria, hematuria that might provide insight into elevation of blood pressure.  We discussed options.  I will have him follow-up in clinic in 6 to 8 weeks after initiating greater lifestyle habits, working improving sleep, and improving his anxiety.  Encouraged him to consider checking blood pressures at home.  If blood pressure remains elevated in 6 to 8 weeks, would consider initiation of medication.  He is in agreement.  - EKG 12-lead, tracing only  - TSH with free T4 reflex; Future  - UA Macroscopic with reflex to Microscopic and Culture; Future  - TSH with free T4 reflex  - UA Macroscopic with reflex to Microscopic and Culture  - UA Microscopic with Reflex to Culture    Impingement syndrome of shoulder region, left  Reviewed nature of condition.  Reviewed symptomatic treatments, over-the-counter anti-inflammatories, ice as needed.  Consider physical therapy, let me know if he like to pursue this.    Fatigue, unspecified type  Nonspecific.  May be related to poor sleep.  He is concerned about testosterone level, will defer to urology.  Will look for thyroid dysfunction, anemia, kidney or liver abnormalities that may be contributing.  - Comprehensive metabolic panel; Future  - CBC with platelets; Future  - TSH with free T4 reflex; Future  - Comprehensive metabolic panel  - CBC with platelets  - TSH with free T4 reflex            Counseling  Appropriate preventive services were addressed with this patient via screening, questionnaire, or discussion as appropriate for fall prevention, nutrition, physical activity, Tobacco-use cessation, social engagement, weight loss and cognition.  Checklist reviewing preventive services available has been given to the patient.  Reviewed patient's diet, addressing concerns and/or questions.   He is at risk for lack of exercise and has been provided with information to increase physical activity for the benefit of his  "well-being.   He is at risk for psychosocial distress and has been provided with information to reduce risk.   The patient's PHQ-9 score is consistent with mild depression. He was provided with information regarding depression.           Britni Curiel is a 64 year old, presenting for the following:  Medicare Visit (Not fasting)           HPI  Seen today for welcome to Medicare preventive care visit and for chronic disease management.  Struggling with left shoulder range of motion loss and discomfort for the past 3 to 6 months, no injury, tender if he lays on it at night.  He is right-handed.  Diagnosed with prostate cancer, followed by urology, electing to follow the \"active surveillance\" treatment as his cancer is very low risk, has follow-up with urology in February.  Concerned as he has been having erection difficulties and energy level loss, worried that he might have low testosterone.  He has not discussed this with urology.  Describes \"terrible sleep\" often awake about 2 hours at a time in the middle of the night.  He is typically worrying when this occurs.  He falls asleep well.  He reports that his anxiety and depression have significantly worsened, attempted to stop his bupropion 6 months ago, had been on it for almost 11 years, wonders if this is contributing.  He is exercising by walking 10 miles per week, working on healthier eating, is reduced his intake of meat and animal products, increase his intake of plant-based products.  Looking forward to a trip to [facility] in January, needing a tetanus booster, hepatitis A vaccine prior to that.  He is agreeable to PCV 20.        Health Care Directive  Patient does not have a Health Care Directive: Discussed advance care planning with patient; information given to patient to review.      12/6/2024   General Health   How would you rate your overall physical health? Good   Feel stress (tense, anxious, or unable to sleep) Rather much      (!) STRESS " CONCERN      12/6/2024   Nutrition   Diet: Low fat/cholesterol            12/6/2024   Exercise   Days per week of moderate/strenous exercise 3 days   Average minutes spent exercising at this level 60 min            12/6/2024   Social Factors   Frequency of gathering with friends or relatives Once a week   Worry food won't last until get money to buy more No   Food not last or not have enough money for food? No   Do you have housing? (Housing is defined as stable permanent housing and does not include staying ouside in a car, in a tent, in an abandoned building, in an overnight shelter, or couch-surfing.) Yes   Are you worried about losing your housing? No   Lack of transportation? No   Unable to get utilities (heat,electricity)? No            12/11/2024   Fall Risk   Fallen 2 or more times in the past year? No    Trouble with walking or balance? No        Patient-reported          12/6/2024   Activities of Daily Living- Home Safety   Needs help with the following daily activites None of the above   Safety concerns in the home None of the above            12/6/2024   Dental   Dentist two times every year? Yes            12/6/2024   Hearing Screening   Hearing concerns? None of the above            12/6/2024   Driving Risk Screening   Patient/family members have concerns about driving No            12/6/2024   General Alertness/Fatigue Screening   Have you been more tired than usual lately? No            12/6/2024   Urinary Incontinence Screening   Bothered by leaking urine in past 6 months No            12/6/2024   TB Screening   Were you born outside of the [place]? No          Today's PHQ-9 Score:       12/11/2024     7:52 AM   PHQ-9 SCORE   PHQ-9 Total Score MyChart 8 (Mild depression)   PHQ-9 Total Score 8        Patient-reported         12/6/2024   Substance Use   Alcohol more than 3/day or more than 7/wk No   Do you have a current opioid prescription? No   How severe/bad is pain from 1 to 10? 4/10   Do you use any  other substances recreationally? No        Social History     Tobacco Use    Smoking status: Never    Smokeless tobacco: Never   Vaping Use    Vaping status: Never Used           12/6/2024   AAA Screening   Family history of Abdominal Aortic Aneurysm (AAA)? No      Last PSA:   Prostate Specific Antigen Screen   Date Value Ref Range Status   07/19/2023 6.15 (H) 0.00 - 4.50 ng/mL Final   10/09/2020 4.1 0.0 - 4.5 ng/mL Final     ASCVD Risk   The 10-year ASCVD risk score (Marie AQUINO, et al., 2019) is: 16.5%    Values used to calculate the score:      Age: 64 years      Sex: Male      Is Non- : No      Diabetic: No      Tobacco smoker: No      Systolic Blood Pressure: 148 mmHg      Is BP treated: No      HDL Cholesterol: 59 mg/dL      Total Cholesterol: 237 mg/dL            Reviewed and updated as needed this visit by Provider                    No past medical history on file.  No past surgical history on file.  OB History   No obstetric history on file.     Lab work is in process  Labs reviewed in EPIC  BP Readings from Last 3 Encounters:   12/11/24 (!) 148/66   03/25/24 (!) 163/92   08/27/23 (!) 145/88    Wt Readings from Last 3 Encounters:   12/11/24 79.2 kg (174 lb 8 oz)   03/25/24 80.7 kg (178 lb)   07/19/23 85.3 kg (188 lb)                  Patient Active Problem List   Diagnosis    Anxiety    Depression, major    Insomnia    Mixed hyperlipidemia    Benign neoplasm of sigmoid colon    Benign neoplasm of ascending colon    Benign neoplasm of descending colon    Adenocarcinoma of prostate (H)     No past surgical history on file.    Social History     Tobacco Use    Smoking status: Never    Smokeless tobacco: Never   Substance Use Topics    Alcohol use: Not on file     Family History   Problem Relation Age of Onset    Diabetes Mother     Chronic Obstructive Pulmonary Disease Mother     Prostate Cancer Father     Diabetes Son 16        type 1         Current Outpatient Medications    Medication Sig Dispense Refill    buPROPion (WELLBUTRIN SR) 150 MG 12 hr tablet Take 1 tablet (150 mg) by mouth daily. 90 tablet 4    hepatitis A vaccine (VAQTA) 50 UNIT/ML injection Inject 1 mL (50 Units) into the muscle once for 1 dose. 1 mL 0    Tdap, tetanus-diptheria-acell pertussis, (BOOSTRIX) 5-2.5-18.5 LF-MCG/0.5 BOY injection Inject 0.5 mLs into the muscle once for 1 dose. 0.5 mL 0    valACYclovir (VALTREX) 1000 mg tablet Take 1 tablet (1,000 mg) by mouth 3 times daily for 7 days 21 tablet 0     No Known Allergies  Recent Labs   Lab Test 07/19/23  1238 10/09/20  1101   A1C 5.4  --    * 149*   HDL 59 62   TRIG 317* 146      Current providers sharing in care for this patient include:  Patient Care Team:  Precious Gonzalez MD as PCP - General (Family Medicine)  Precious Gonzalez MD as Assigned PCP    The following health maintenance items are reviewed in Epic and correct as of today:  Health Maintenance   Topic Date Due    DEPRESSION ACTION PLAN  Never done    DTAP/TDAP/TD IMMUNIZATION (2 - Td or Tdap) 07/20/2019    GLUCOSE  10/09/2023    LIPID  07/19/2024    ANNUAL REVIEW OF HM ORDERS  07/19/2024    MEDICARE ANNUAL WELLNESS VISIT  08/25/2024    Pneumococcal Vaccine: 65+ Years (1 of 1 - PCV) 08/25/2024    PHQ-9  06/11/2025    FALL RISK ASSESSMENT  12/11/2025    COLORECTAL CANCER SCREENING  08/29/2026    ADVANCE CARE PLANNING  07/19/2028    RSV VACCINE (1 - 1-dose 75+ series) 08/25/2034    HEPATITIS C SCREENING  Completed    HIV SCREENING  Completed    INFLUENZA VACCINE  Completed    ZOSTER IMMUNIZATION  Completed    COVID-19 Vaccine  Completed    HPV IMMUNIZATION  Aged Out    MENINGITIS IMMUNIZATION  Aged Out    RSV MONOCLONAL ANTIBODY  Aged Out         Review of Systems  Constitutional, neuro, ENT, endocrine, pulmonary, cardiac, gastrointestinal, genitourinary, musculoskeletal, integument and psychiatric systems are negative, except as otherwise noted.     Objective    Exam  BP (!) 148/66   Pulse  "80   Temp 98.4  F (36.9  C) (Oral)   Resp 18   Ht 1.797 m (5' 10.75\")   Wt 79.2 kg (174 lb 8 oz)   SpO2 99%   BMI 24.51 kg/m     Estimated body mass index is 24.51 kg/m  as calculated from the following:    Height as of this encounter: 1.797 m (5' 10.75\").    Weight as of this encounter: 79.2 kg (174 lb 8 oz).    Physical Exam  GENERAL: alert and no distress  EYES: Eyes grossly normal to inspection, PERRL and conjunctivae and sclerae normal  HENT: ear canals and TM's normal, nose and mouth without ulcers or lesions  NECK: no adenopathy, no asymmetry, masses, or scars  RESP: lungs clear to auscultation - no rales, rhonchi or wheezes  CV: regular rate and rhythm, normal S1 S2, no S3 or S4, no murmur, click or rub, no peripheral edema  ABDOMEN: soft, nontender, no hepatosplenomegaly, no masses and bowel sounds normal  MS: no gross musculoskeletal defects noted, no edema  SKIN: no suspicious lesions or rashes  NEURO: Normal strength and tone, mentation intact and speech normal  PSYCH: mentation appears normal, affect normal/bright        12/11/2024   Mini Cog   Clock Draw Score 2 Normal   3 Item Recall 3 objects recalled   Mini Cog Total Score 5            I ordered and personally reviewed a twelve-lead EKG which reveals normal sinus rhythm, no dysrhythmia, ischemic changes, left ventricular hypertrophy.    Vision Screen  Patient wears corrective lenses (select all that apply): Worn during vision screen  Vision Screen Results: Pass      Signed Electronically by: Precious Gonzalez MD    "

## 2024-12-11 NOTE — PATIENT INSTRUCTIONS
See sleep hygiene handout.  Resume bupropion.  Return to clinic in 2 months to reassess how you are feeling after resumption.    Your pneumonia vaccine was administered today.  I recommend you receive a Tdap vaccine and hepatitis A vaccine (2 dose series)--- it looks like this is best part covered at a pharmacy rather than in clinic.    Discussed your concerns about testosterone and erection difficulties with your urology team.    For your left shoulder pain, you may take ibuprofen up to 2400 mg daily, acetaminophen can be used along with this.  Avoid activities above shoulder height.  Let me know if you would like physical therapy to help with this.    Blood pressure today is high again.  Lets see if we can get your blood pressure to come down by improving her sleep by improving your depression and anxiety symptoms by restarting bupropion.  Consider obtaining a blood pressure cuff for home use, if you do so bring this with you to your follow-up visit.  Plan follow-up with me in 6 to 8 weeks to reassess anxiety and depression symptoms and blood pressure.      Patient Education   Preventive Care Advice   This is general advice given by our system to help you stay healthy. However, your care team may have specific advice just for you. Please talk to your care team about your preventive care needs.  Nutrition  Eat 5 or more servings of fruits and vegetables each day.  Try wheat bread, brown rice and whole grain pasta (instead of white bread, rice, and pasta).  Get enough calcium and vitamin D. Check the label on foods and aim for 100% of the RDA (recommended daily allowance).  Lifestyle  Exercise at least 150 minutes each week  (30 minutes a day, 5 days a week).  Do muscle strengthening activities 2 days a week. These help control your weight and prevent disease.  No smoking.  Wear sunscreen to prevent skin cancer.  Have a dental exam and cleaning every 6 months.  Yearly exams  See your health care team every year to  talk about:  Any changes in your health.  Any medicines your care team has prescribed.  Preventive care, family planning, and ways to prevent chronic diseases.  Shots (vaccines)   HPV shots (up to age 26), if you've never had them before.  Hepatitis B shots (up to age 59), if you've never had them before.  COVID-19 shot: Get this shot when it's due.  Flu shot: Get a flu shot every year.  Tetanus shot: Get a tetanus shot every 10 years.  Pneumococcal, hepatitis A, and RSV shots: Ask your care team if you need these based on your risk.  Shingles shot (for age 50 and up)  General health tests  Diabetes screening:  Starting at age 35, Get screened for diabetes at least every 3 years.  If you are younger than age 35, ask your care team if you should be screened for diabetes.  Cholesterol test: At age 39, start having a cholesterol test every 5 years, or more often if advised.  Bone density scan (DEXA): At age 50, ask your care team if you should have this scan for osteoporosis (brittle bones).  Hepatitis C: Get tested at least once in your life.  STIs (sexually transmitted infections)  Before age 24: Ask your care team if you should be screened for STIs.  After age 24: Get screened for STIs if you're at risk. You are at risk for STIs (including HIV) if:  You are sexually active with more than one person.  You don't use condoms every time.  You or a partner was diagnosed with a sexually transmitted infection.  If you are at risk for HIV, ask about PrEP medicine to prevent HIV.  Get tested for HIV at least once in your life, whether you are at risk for HIV or not.  Cancer screening tests  Cervical cancer screening: If you have a cervix, begin getting regular cervical cancer screening tests starting at age 21.  Breast cancer scan (mammogram): If you've ever had breasts, begin having regular mammograms starting at age 40. This is a scan to check for breast cancer.  Colon cancer screening: It is important to start screening  for colon cancer at age 45.  Have a colonoscopy test every 10 years (or more often if you're at risk) Or, ask your provider about stool tests like a FIT test every year or Cologuard test every 3 years.  To learn more about your testing options, visit:   .  For help making a decision, visit:   https://bit.ly/bs47072.  Prostate cancer screening test: If you have a prostate, ask your care team if a prostate cancer screening test (PSA) at age 55 is right for you.  Lung cancer screening: If you are a current or former smoker ages 50 to 80, ask your care team if ongoing lung cancer screenings are right for you.  For informational purposes only. Not to replace the advice of your health care provider. Copyright   2023 [employer]. All rights reserved. Clinically reviewed by the [facility]. [place] [facility] [id number] - REV 01/24.  Learning About Stress  What is stress?     Stress is your body's response to a hard situation. Your body can have a physical, emotional, or mental response. Stress is a fact of life for most people, and it affects everyone differently. What causes stress for you may not be stressful for someone else.  A lot of things can cause stress. You may feel stress when you go on a job interview, take a test, or run a race. This kind of short-term stress is normal and even useful. It can help you if you need to work hard or react quickly. For example, stress can help you finish an important job on time.  Long-term stress is caused by ongoing stressful situations or events. Examples of long-term stress include long-term health problems, ongoing problems at work, or conflicts in your family. Long-term stress can harm your health.  How does stress affect your health?  When you are stressed, your body responds as though you are in danger. It makes hormones that speed up your heart, make you breathe faster, and give you a burst of energy. This is called the fight-or-flight stress  response. If the stress is over quickly, your body goes back to normal and no harm is done.  But if stress happens too often or lasts too long, it can have bad effects. Long-term stress can make you more likely to get sick, and it can make symptoms of some diseases worse. If you tense up when you are stressed, you may develop neck, shoulder, or low back pain. Stress is linked to high blood pressure and heart disease.  Stress also harms your emotional health. It can make you perez, tense, or depressed. Your relationships may suffer, and you may not do well at work or school.  What can you do to manage stress?  You can try these things to help manage stress:   Do something active. Exercise or activity can help reduce stress. Walking is a great way to get started. Even everyday activities such as housecleaning or yard work can help.  Try yoga or kita chi. These techniques combine exercise and meditation. You may need some training at first to learn them.  Do something you enjoy. For example, listen to music or go to a movie. Practice your hobby or do volunteer work.  Meditate. This can help you relax, because you are not worrying about what happened before or what may happen in the future.  Do guided imagery. Imagine yourself in any setting that helps you feel calm. You can use online videos, books, or a teacher to guide you.  Do breathing exercises. For example:  From a standing position, bend forward from the waist with your knees slightly bent. Let your arms dangle close to the floor.  Breathe in slowly and deeply as you return to a standing position. Roll up slowly and lift your head last.  Hold your breath for just a few seconds in the standing position.  Breathe out slowly and bend forward from the waist.  Let your feelings out. Talk, laugh, cry, and express anger when you need to. Talking with supportive friends or family, a counselor, or a michael leader about your feelings is a healthy way to relieve stress.  "Avoid discussing your feelings with people who make you feel worse.  Write. It may help to write about things that are bothering you. This helps you find out how much stress you feel and what is causing it. When you know this, you can find better ways to cope.  What can you do to prevent stress?  You might try some of these things to help prevent stress:  Manage your time. This helps you find time to do the things you want and need to do.  Get enough sleep. Your body recovers from the stresses of the day while you are sleeping.  Get support. Your family, friends, and community can make a difference in how you experience stress.  Limit your news feed. Avoid or limit time on social media or news that may make you feel stressed.  Do something active. Exercise or activity can help reduce stress. Walking is a great way to get started.  Where can you learn more?  Go to https://www.[employer]cho[employer].net/patiented  Enter N032 in the search box to learn more about \"Learning About Stress.\"  Current as of: October 24, 2023  Content Version: 14.2 2024 [employer].   Care instructions adapted under license by your healthcare professional. If you have questions about a medical condition or this instruction, always ask your healthcare professional. [employer] disclaims any warranty or liability for your use of this information.    Learning About Depression Screening  What is depression screening?  Depression screening is a way to see if you have depression symptoms. It may be done by a doctor or counselor. It's often part of a routine checkup. That's because your mental health is just as important as your physical health.  Depression is a mental health condition that affects how you feel, think, and act. You may:  Have less energy.  Lose interest in your daily activities.  Feel sad and grouchy for a long time.  Depression is very common. It affects people of all ages.  Many things can lead to depression. Some " "people become depressed after they have a stroke or find out they have a major illness like cancer or heart disease. The death of a loved one or a breakup may lead to depression. It can run in families. Most experts believe that a combination of inherited genes and stressful life events can cause it.  What happens during screening?  You may be asked to fill out a form about your depression symptoms. You and the doctor will discuss your answers. The doctor may ask you more questions to learn more about how you think, act, and feel.  What happens after screening?  If you have symptoms of depression, your doctor will talk to you about your options.  Doctors usually treat depression with medicines or counseling. Often, combining the two works best. Many people don't get help because they think that they'll get over the depression on their own. But people with depression may not get better unless they get treatment.  The cause of depression is not well understood. There may be many factors involved. But if you have depression, it's not your fault.  A serious symptom of depression is thinking about death or suicide. If you or someone you care about talks about this or about feeling hopeless, get help right away.  It's important to know that depression can be treated. Medicine, counseling, and self-care may help.  Where can you learn more?  Go to https://www.[employer].net/patiented  Enter T185 in the search box to learn more about \"Learning About Depression Screening.\"  Current as of: June 24, 2023  Content Version: 14.2 2024 [facility] [employer].   Care instructions adapted under license by your healthcare professional. If you have questions about a medical condition or this instruction, always ask your healthcare professional. [employer] disclaims any warranty or liability for your use of this information.       "

## 2024-12-12 ENCOUNTER — TELEPHONE (OUTPATIENT)
Dept: FAMILY MEDICINE | Facility: CLINIC | Age: 64
End: 2024-12-12
Payer: MEDICARE

## 2024-12-12 DIAGNOSIS — F41.9 ANXIETY: ICD-10-CM

## 2024-12-12 DIAGNOSIS — F32.5 MAJOR DEPRESSIVE DISORDER IN FULL REMISSION, UNSPECIFIED WHETHER RECURRENT (H): ICD-10-CM

## 2024-12-12 LAB
ATRIAL RATE - MUSE: 66 BPM
DIASTOLIC BLOOD PRESSURE - MUSE: NORMAL MMHG
INTERPRETATION ECG - MUSE: NORMAL
P AXIS - MUSE: 69 DEGREES
PR INTERVAL - MUSE: 172 MS
QRS DURATION - MUSE: 76 MS
QT - MUSE: 378 MS
QTC - MUSE: 396 MS
R AXIS - MUSE: -8 DEGREES
SYSTOLIC BLOOD PRESSURE - MUSE: NORMAL MMHG
T AXIS - MUSE: 52 DEGREES
VENTRICULAR RATE- MUSE: 66 BPM

## 2024-12-12 RX ORDER — BUPROPION HYDROCHLORIDE 150 MG/1
150 TABLET, EXTENDED RELEASE ORAL DAILY
Qty: 90 TABLET | Refills: 4 | Status: SHIPPED | OUTPATIENT
Start: 2024-12-12

## 2024-12-15 DIAGNOSIS — R74.8 ELEVATED ALKALINE PHOSPHATASE LEVEL: Primary | ICD-10-CM

## 2024-12-17 DIAGNOSIS — R74.8 ELEVATED ALKALINE PHOSPHATASE LEVEL: Primary | ICD-10-CM

## 2025-01-20 ENCOUNTER — LAB (OUTPATIENT)
Dept: LAB | Facility: CLINIC | Age: 65
End: 2025-01-20
Payer: MEDICARE

## 2025-01-20 DIAGNOSIS — K76.9 LIVER DISEASE, UNSPECIFIED: ICD-10-CM

## 2025-01-20 DIAGNOSIS — R74.8 ELEVATED ALKALINE PHOSPHATASE LEVEL: ICD-10-CM

## 2025-01-20 LAB
ALBUMIN SERPL BCG-MCNC: 4 G/DL (ref 3.5–5.2)
ALP SERPL-CCNC: 349 U/L (ref 40–150)
ALT SERPL W P-5'-P-CCNC: 47 U/L (ref 0–70)
AST SERPL W P-5'-P-CCNC: 40 U/L (ref 0–45)
BILIRUB DIRECT SERPL-MCNC: <0.2 MG/DL (ref 0–0.3)
BILIRUB SERPL-MCNC: 0.4 MG/DL
ERYTHROCYTE [DISTWIDTH] IN BLOOD BY AUTOMATED COUNT: 12.5 % (ref 10–15)
GGT SERPL-CCNC: 945 U/L (ref 8–61)
HCT VFR BLD AUTO: 44.6 % (ref 40–53)
HGB BLD-MCNC: 15.3 G/DL (ref 13.3–17.7)
MCH RBC QN AUTO: 29.5 PG (ref 26.5–33)
MCHC RBC AUTO-ENTMCNC: 34.3 G/DL (ref 31.5–36.5)
MCV RBC AUTO: 86 FL (ref 78–100)
PLATELET # BLD AUTO: 260 10E3/UL (ref 150–450)
PROT SERPL-MCNC: 8.4 G/DL (ref 6.4–8.3)
RBC # BLD AUTO: 5.19 10E6/UL (ref 4.4–5.9)
VIT D+METAB SERPL-MCNC: 32 NG/ML (ref 20–50)
WBC # BLD AUTO: 7.7 10E3/UL (ref 4–11)

## 2025-01-20 PROCEDURE — 80076 HEPATIC FUNCTION PANEL: CPT

## 2025-01-20 PROCEDURE — 82977 ASSAY OF GGT: CPT

## 2025-01-20 PROCEDURE — 82306 VITAMIN D 25 HYDROXY: CPT

## 2025-01-20 PROCEDURE — 85027 COMPLETE CBC AUTOMATED: CPT

## 2025-01-20 PROCEDURE — 36415 COLL VENOUS BLD VENIPUNCTURE: CPT

## 2025-01-21 ENCOUNTER — TELEPHONE (OUTPATIENT)
Dept: FAMILY MEDICINE | Facility: CLINIC | Age: 65
End: 2025-01-21
Payer: MEDICARE

## 2025-01-21 DIAGNOSIS — R74.8 ELEVATED ALKALINE PHOSPHATASE LEVEL: Primary | ICD-10-CM

## 2025-01-21 NOTE — RESULT ENCOUNTER NOTE
Your elevated GGT levels indicate the elevated alkaline phosphatase level is liver related.  The rest of your liver function looks good.  The next step is to evaluate the appearance of your liver and gall bladder with an ultrasound.  I will have radiology contact you to schedule.

## 2025-01-21 NOTE — TELEPHONE ENCOUNTER
Writer called patient and left message to return call to clinic.     If patient returns call please route to nurse queue to discuss lab result note per provider.     Viola Pearl RN  [facility]       ----- Message from Precious Gonzalez sent at 1/21/2025  3:12 PM CST -----  Your elevated GGT levels indicate the elevated alkaline phosphatase level is liver related.  The rest of your liver function looks good.  The next step is to evaluate the appearance of your liver and gall bladder with an ultrasound.  I will have radiology contact you to schedule.

## 2025-01-22 NOTE — TELEPHONE ENCOUNTER
Patient has reviewed results via [employer] and is already scheduled for US, no need for further outreach.     Joey Resendez RN  [facility]

## 2025-01-24 ENCOUNTER — HOSPITAL ENCOUNTER (OUTPATIENT)
Dept: ULTRASOUND IMAGING | Facility: CLINIC | Age: 65
Discharge: HOME | End: 2025-01-24
Attending: FAMILY MEDICINE | Admitting: FAMILY MEDICINE
Payer: MEDICARE

## 2025-01-24 DIAGNOSIS — R74.8 ELEVATED ALKALINE PHOSPHATASE LEVEL: ICD-10-CM

## 2025-01-24 PROCEDURE — 76705 ECHO EXAM OF ABDOMEN: CPT

## 2025-01-24 NOTE — RESULT ENCOUNTER NOTE
Your liver appears normal and healthy on ultrasound imaging, which is great news!  It does not identify the cause of your elevated alkaline phosphatase level.  I recommend we perform additional blood work to look for causes, specifically looking for autoimmune conditions and infections that can lead to liver inflammation.  If present, early identification can help to reduce the likelihood of liver damage and cirrhosis in the future.    Incidentally, a 10 mm cyst was seen in your pancreas.  I would not expect this to be causing symptoms.  While the cyst did not have any concerning appearance, it is important that we reassess this in 6 months and followed over time to ensure it is not changing.  Would usually do that every 6 months for a year, then yearly for 2 years, then every 2 years if stable.  We can do so with the CT scan.  I will place an order for the CT scan to be done in 6 months, the radiology team will contact you to schedule.

## 2025-02-06 ENCOUNTER — OFFICE VISIT (OUTPATIENT)
Dept: FAMILY MEDICINE | Facility: CLINIC | Age: 65
End: 2025-02-06
Payer: MEDICARE

## 2025-02-06 VITALS
RESPIRATION RATE: 18 BRPM | WEIGHT: 167.7 LBS | TEMPERATURE: 98.1 F | OXYGEN SATURATION: 99 % | SYSTOLIC BLOOD PRESSURE: 132 MMHG | HEART RATE: 72 BPM | DIASTOLIC BLOOD PRESSURE: 86 MMHG | HEIGHT: 70 IN | BODY MASS INDEX: 24.01 KG/M2

## 2025-02-06 DIAGNOSIS — Z11.59 ENCOUNTER FOR SCREENING FOR OTHER VIRAL DISEASES: ICD-10-CM

## 2025-02-06 DIAGNOSIS — K86.2 PANCREAS CYST: ICD-10-CM

## 2025-02-06 DIAGNOSIS — C61 ADENOCARCINOMA OF PROSTATE (H): ICD-10-CM

## 2025-02-06 DIAGNOSIS — R74.8 ELEVATED ALKALINE PHOSPHATASE LEVEL: Primary | ICD-10-CM

## 2025-02-06 LAB
HAV AB SER QL IA: REACTIVE
HBV SURFACE AB SERPL IA-ACNC: <3.5 M[IU]/ML
HBV SURFACE AB SERPL IA-ACNC: NONREACTIVE M[IU]/ML
HBV SURFACE AG SERPL QL IA: NONREACTIVE
HOLD SPECIMEN: NORMAL
LIPASE SERPL-CCNC: 64 U/L (ref 13–60)

## 2025-02-06 ASSESSMENT — PAIN SCALES - GENERAL: PAINLEVEL_OUTOF10: NO PAIN (0)

## 2025-02-06 NOTE — PROGRESS NOTES
Assessment & Plan     Elevated alkaline phosphatase level  Elevated alkaline phosphatase in the setting of elevated GGT with remainder of liver labs normal, normal liver ultrasound, known adenocarcinoma of the prostate thought to be of low risk and undergoing active surveillance, moderate alcohol intake.  Will assess for autoimmune and viral etiologies of elevated alkaline phosphatase and elevated GGT level.  Will look at bony isoenzymes of alkaline phosphatase.  Will recheck PSA as sudden or severe elevation in PSA may be suggestive of metastatic disease and I think it is important to assess for this.  Further follow-up and recommendations pending results.  - Anti Nuclear Nydia IgG by IFA with Reflex; Future  - Lipase; Future  - Hepatitis A Antibody Total  - Hepatitis B Surface Antibody  - Hepatitis B surface antigen  - F Actin EIA with reflex  - Liver kidney microsomal antibody IgG  - Mitochondrial M2 Antibody IgG  - Anti Nuclear Nydia IgG by IFA with Reflex  - Lipase  - Extra Tube; Future  - Extra Tube  - PSA, tumor marker; Future  - Alkaline phosphatase isoenzymes; Future    Encounter for screening for other viral diseases  Will screen for hepatitis A, B, and C.  - Hepatitis B Surface Antibody  - Hepatitis B surface antigen  - Extra Tube; Future  - Extra Tube    Adenocarcinoma of prostate (H)  Will reassess PSA level to assess risk of metastatic prostate disease contributing to elevated alkaline phosphatase level.  - PSA, tumor marker; Future    Pancreas cyst  We will check lipase level today.  If normal, will plan CT imaging of pancreas in 6 months.  - Extra Tube; Future  - Extra Tube                Britni Curiel is a 64 year old, presenting for the following health issues:  Follow Up    Seen in clinic today for follow-up of elevated bated alkaline phosphatase level and elevated GGT identified at time of preventive care visit.  He denies GI symptoms, specifically denies any nausea, vomiting, or early  "satiety.  Denies abdominal fullness.  Feels his weight has been overall stable.  Has not had any bone pain.  Denies any personal or family history of liver disease.  Drinking 8-10 drinks per week previously, has reduced to 3-4 drinks per week since abnormal liver enzyme was identified.  Denies personal or family history of autoimmune conditions.                       Objective    /86   Pulse 72   Temp 98.1  F (36.7  C) (Oral)   Resp 18   Ht 1.784 m (5' 10.25\")   Wt 76.1 kg (167 lb 11.2 oz)   SpO2 99%   BMI 23.89 kg/m    Body mass index is 23.89 kg/m .  Physical Exam   Alert and pleasant.  Abdomen is soft and nontender, no organomegaly, no ascites.            Signed Electronically by: Precious Gonzalez MD    "

## 2025-02-08 LAB — SMA IGG SER-ACNC: 46 UNITS

## 2025-02-09 LAB
ALP BONE SERPL-CCNC: 72 U/L
ALP LIVER SERPL-CCNC: 306 U/L
ALP OTHER SERPL-CCNC: 0 U/L
ALP SERPL-CCNC: 378 U/L
SMOOTH MUSCLE IGG TITR SER: ABNORMAL {TITER}

## 2025-02-10 LAB — LKM AB TITR SER IF: NORMAL {TITER}

## 2025-02-13 DIAGNOSIS — R74.8 ELEVATED ALKALINE PHOSPHATASE LEVEL: Primary | ICD-10-CM

## 2025-02-24 ENCOUNTER — TRANSFERRED RECORDS (OUTPATIENT)
Dept: HEALTH INFORMATION MANAGEMENT | Facility: CLINIC | Age: 65
End: 2025-02-24
Payer: MEDICARE

## 2025-04-28 ENCOUNTER — TRANSFERRED RECORDS (OUTPATIENT)
Dept: ADMINISTRATIVE | Facility: CLINIC | Age: 65
End: 2025-04-28
Payer: MEDICARE

## 2025-04-29 NOTE — PROGRESS NOTES
_________________________________________________________________________________________________    [address] [address]  [place] [address]  [phone]      Patient:            Moisés Sorensen   YOB: 1960  Date:                    4/28/2025  Historian:    self  Visit Type:              Office Visit   Rendering Provider:  Elisa LOO    History of Present Illness:    Moisés Sorensen is a 64-year-old male seen in the office for newly diagnosed primary biliary cholangitis with elevated liver enzymes as well as pancreatic cyst.    He has a PMH of prostate cancer, depression, and HLD.  External record review from [facility] notes liver tests from December 2024 with an alkaline phosphatase of 378 and mild transaminase elevation with an AST 47 and AST 57.  He does not have recent check of liver tests prior to this.  Repeat in January showed an alkaline phosphatase of 349 and alkaline phosphatase isoenzymes February 6 showed an alkaline phosphatase of 378 with %liver of 306, %bone 72, and %other 0.  He had a positive SMA of 46 and positive AMA of >220.  He is immune to hepatitis A but not hepatitis B with negative hepatitis B/C screening.  A liver ultrasound from January 2025 showed an unremarkable appearance to the liver and gallbladder with a CBD of 3 mm.  He was noted to have a 10 mm simple cyst in the pancreatic tail for which MRI/MRCP was recommended in 6 months. He reports having an MRI after that but this was and MRI prostate February 17, 2025 [facility] and the pancreas was not evaluated. This result was obtained after his visit.     He presents to the office today feeling well.  He denies any significant fatigue or pruritus.  He also denies any abdominal pain or GI symptoms.  There is no known family history of liver disease.  He drinks about 6-7 alcoholic beverages per week over the course of several days.  He does not use tobacco.  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    He had a colonoscopy in August 2023 with removal of 6 adenomatous polyps and repeat was recommended in 3 years.    Assessment/Plan  # Detail Type Description   1. Assessment Primary biliary cholangitis (K74.3).   2. Assessment Elevated liver enzymes (R74.8).   3. Assessment Pancreatic cyst (K86.2).     Detail Type Description   Impression This is a 64-year-old male seen for likely newly diagnosed primary biliary cholangitis with elevated liver enzymes as well as pancreatic cyst.    PBC  He was noted to have significant alkaline phosphatase elevation on lab work at his PCP in December 2024, which was replicated on recheck and isoenzymes shows significant liver fraction.  He has a positive SMA but also a positive AMA making primary biliary cholangitis likely.  We talked about the role of liver biopsy but this was deferred given lab work consistent with PBC. This diagnosis was discussed in detail today. The need for fibrosis staging was discussed with plan for FibroScan.  He will be started on ursodiol 1000 mg daily (12.53 mg/kg) with monitoring of liver tests.  If they remain elevated or transaminases elevate further then biopsy would need to be considered to look for autoimmune hepatitis given positive SMA.  He does not have any pruritus at this time. He drinks alcohol socially with no indication for reduction/cessation at this time.    Pancreatic cyst  Ultrasound from January 2025 showed a 10 mm pancreatic tail cyst for which MRI/MRCP was recommended in 6 months and was ordered today.  If enlargement or concerning features then EUS would need to be considered going forward.    Follow-up visit is planned in 6 months or sooner if needed.     Detail Type Description   Patient Plan 1. Start ursodiol 500 mg twice daily- let me know if this is not well-covered  2. Repeat liver tests in 3 months  3. FibroScan with CAP at your convenience  4. MRI/MRCP in July 2025  5. Follow up  visit in 6 months or reach out sooner if needed     Orders  Labs:  Test Comments Timeframe Assessment   Hepatic Function Panel  3 Months K74.3       Diagnostics:  Procedure Comments Timeframe Assessment   MRCP Biliary/Pancreatic Ducts WITHOUT And WITH Contrast MRI/MRCP-  6 mo f/u to 10 mm pancreatic tail cyst on US 1/24/25 3 Months K86.2   MRI Pancreas WITH Contrast MRI/MRCP-  6 mo f/u to 10 mm pancreatic tail cyst on US 1/24/25 3 Months K86.2     Office Procedures:  Service Comments Timeframe Assessment   FibroScan With CAP  First Available K74.3     Follow-up visit/Referral:  Order Comments   follow-up visit with Elisa LOO in 6 Months or by 10/28/2025      If your health care provider has asked for a follow-up visit, your appointment will be scheduled with a nurse practitioner or physician assistant on your provider's care team, unless noted above.      cc:  Precious Gonzalez MD  cc:  Precious Gonzalez MD    Electronically Signed by:  Elisa LOO  04/28/2025 11:46 AM      Medications:  Medication Dose Sig Description Comments   bupropion HCl  mg tablet,sustained-release 150 mg take 1 Capsule by Oral route  every day taking as directed   ursodiol 500 mg tablet 500 mg take 1 by Oral route 2 times every day      Allergies:  Medication Name Ingredient Reaction Comment    NO KNOWN ALLERGIES       Vitals:  BP mm/Hg Pulse/min Resp/min Temp F Height (Total in.) Weight (lbs.) Weight (oz.) BMI   171/105 83   71.00 176.00  24.55     Race:  White  Ethnicity:  Not  or   Preferred Language:  English

## 2025-07-28 ENCOUNTER — TRANSFERRED RECORDS (OUTPATIENT)
Dept: MULTI SPECIALTY CLINIC | Facility: CLINIC | Age: 65
End: 2025-07-28
Payer: MEDICARE

## 2025-07-28 LAB
ALT SERPL-CCNC: 26 IU/L (ref 0–44)
AST SERPL-CCNC: 28 IU/L (ref 0–40)

## 2025-07-29 ENCOUNTER — TRANSFERRED RECORDS (OUTPATIENT)
Dept: ADMINISTRATIVE | Facility: CLINIC | Age: 65
End: 2025-07-29
Payer: MEDICARE

## 2025-07-30 NOTE — PROCEDURES
2025        Moisés Edu   [address] [place] [id number]      Moisés Sorensen,  :  1960    Your alkaline phosphatase elevation has improved from 378 in February and is 229 now, presumably due to ursodiol.  The rest of your liver tests remain within acceptable range.  Recheck liver tests again in 3 months before a visit with me in October/2025, which you should be able to call and schedule in about 2 weeks.  Hepatic Function Panel (7) 2025 09:13   Description Result Units Flags Range   Bilirubin, Total 0.4 mg/dL  0.0-1.2   Bilirubin, Direct 0.18 mg/dL  0.00-0.40   AST (SGOT) 28 IU/L  0-40   ALT (SGPT) 26 IU/L  0-44   Albumin 4.1 g/dL  3.9-4.9   Alkaline Phosphatase 229 IU/L H    Protein, Total 7.3 g/dL  6.0-8.5   Comments   Performed At: , [facility] [address], [place], [place], [id number]  Nelson Mtz MD, Phone: [phone]           Thank you.    Electronically signed by:  Elisa LOO 2025 10:58 AM  Document generated by:  Elisa LOO  2025  If your provider ordered multiple tests; the results may not become available at the same time.  If multiple test results are received within 14 days of one another, you may receive a duplicate.  cc:  Precious Gonzalez MD

## 2025-08-26 ENCOUNTER — TRANSFERRED RECORDS (OUTPATIENT)
Dept: HEALTH INFORMATION MANAGEMENT | Facility: CLINIC | Age: 65
End: 2025-08-26
Payer: MEDICARE